# Patient Record
Sex: MALE | Race: WHITE | NOT HISPANIC OR LATINO | Employment: OTHER | URBAN - METROPOLITAN AREA
[De-identification: names, ages, dates, MRNs, and addresses within clinical notes are randomized per-mention and may not be internally consistent; named-entity substitution may affect disease eponyms.]

---

## 2017-12-13 ENCOUNTER — HISTORICAL (OUTPATIENT)
Dept: PREADMISSION TESTING | Facility: HOSPITAL | Age: 52
End: 2017-12-13

## 2017-12-13 LAB
BUN SERPL-MCNC: 15 MG/DL (ref 7–18)
CALCIUM SERPL-MCNC: 8 MG/DL (ref 8.5–10.1)
CHLORIDE SERPL-SCNC: 104 MMOL/L (ref 98–107)
CO2 SERPL-SCNC: 27 MMOL/L (ref 21–32)
CREAT SERPL-MCNC: 1.25 MG/DL (ref 0.7–1.3)
GLUCOSE SERPL-MCNC: 79 MG/DL (ref 74–106)
POTASSIUM SERPL-SCNC: 4.4 MMOL/L (ref 3.5–5.1)
SODIUM SERPL-SCNC: 138 MMOL/L (ref 136–145)

## 2017-12-14 ENCOUNTER — HISTORICAL (OUTPATIENT)
Dept: SURGERY | Facility: HOSPITAL | Age: 52
End: 2017-12-14

## 2018-05-15 ENCOUNTER — HISTORICAL (OUTPATIENT)
Dept: INTERNAL MEDICINE | Facility: CLINIC | Age: 53
End: 2018-05-15

## 2018-07-26 ENCOUNTER — HISTORICAL (OUTPATIENT)
Dept: ADMINISTRATIVE | Facility: HOSPITAL | Age: 53
End: 2018-07-26

## 2018-07-26 ENCOUNTER — HISTORICAL (OUTPATIENT)
Dept: INTERNAL MEDICINE | Facility: CLINIC | Age: 53
End: 2018-07-26

## 2018-07-26 LAB
ABS NEUT (OLG): 4.4 X10(3)/MCL (ref 2.1–9.2)
ALBUMIN SERPL-MCNC: 2.8 GM/DL (ref 3.4–5)
ALBUMIN/GLOB SERPL: 1 RATIO (ref 1–2)
ALP SERPL-CCNC: 154 UNIT/L (ref 45–117)
ALT SERPL-CCNC: 16 UNIT/L (ref 12–78)
AST SERPL-CCNC: 42 UNIT/L (ref 15–37)
BASOPHILS # BLD AUTO: 0.07 X10(3)/MCL
BASOPHILS NFR BLD AUTO: 1 %
BILIRUB SERPL-MCNC: 0.5 MG/DL (ref 0.2–1)
BILIRUBIN DIRECT+TOT PNL SERPL-MCNC: 0.2 MG/DL
BILIRUBIN DIRECT+TOT PNL SERPL-MCNC: 0.3 MG/DL
BUN SERPL-MCNC: 9 MG/DL (ref 7–18)
CALCIUM SERPL-MCNC: 7.9 MG/DL (ref 8.5–10.1)
CHLORIDE SERPL-SCNC: 103 MMOL/L (ref 98–107)
CO2 SERPL-SCNC: 29 MMOL/L (ref 21–32)
CREAT SERPL-MCNC: 1.2 MG/DL (ref 0.6–1.3)
EOSINOPHIL # BLD AUTO: 0.36 X10(3)/MCL
EOSINOPHIL NFR BLD AUTO: 5 %
ERYTHROCYTE [DISTWIDTH] IN BLOOD BY AUTOMATED COUNT: 19 % (ref 11.5–14.5)
FERRITIN SERPL-MCNC: 19.2 NG/ML (ref 26–388)
FOLATE SERPL-MCNC: 59.5 NG/ML (ref 3.1–17.5)
GLOBULIN SER-MCNC: 5.5 GM/ML (ref 2.3–3.5)
GLUCOSE SERPL-MCNC: 110 MG/DL (ref 74–106)
HCT VFR BLD AUTO: 30.7 % (ref 40–51)
HGB BLD-MCNC: 8.1 GM/DL (ref 13.5–17.5)
IMM GRANULOCYTES # BLD AUTO: 0.01 10*3/UL
IMM GRANULOCYTES NFR BLD AUTO: 0 %
IRON SATN MFR SERPL: 4.9 % (ref 15–50)
IRON SERPL-MCNC: 17 MCG/DL (ref 65–175)
LYMPHOCYTES # BLD AUTO: 1.67 X10(3)/MCL
LYMPHOCYTES NFR BLD AUTO: 23 % (ref 13–40)
MCH RBC QN AUTO: 19.8 PG (ref 26–34)
MCHC RBC AUTO-ENTMCNC: 26.4 GM/DL (ref 31–37)
MCV RBC AUTO: 74.9 FL (ref 80–100)
MONOCYTES # BLD AUTO: 0.69 X10(3)/MCL
MONOCYTES NFR BLD AUTO: 10 % (ref 4–12)
NEUTROPHILS # BLD AUTO: 4.4 X10(3)/MCL
NEUTROPHILS NFR BLD AUTO: 61 X10(3)/MCL
PLATELET # BLD AUTO: 348 X10(3)/MCL (ref 130–400)
PMV BLD AUTO: 9.7 FL (ref 7.4–10.4)
POTASSIUM SERPL-SCNC: 4.4 MMOL/L (ref 3.5–5.1)
PROT SERPL-MCNC: 8.3 GM/DL (ref 6.4–8.2)
RBC # BLD AUTO: 4.1 X10(6)/MCL (ref 4.5–5.9)
RET# (OHS): 0.08 X10(6)/MCL (ref 0.02–0.09)
RETICULOCYTE COUNT AUTOMATED (OLG): 2 % (ref 0.5–1.5)
SODIUM SERPL-SCNC: 139 MMOL/L (ref 136–145)
TIBC SERPL-MCNC: 347 MCG/DL (ref 250–450)
TRANSFERRIN SERPL-MCNC: 265 MG/DL (ref 200–360)
VIT B12 SERPL-MCNC: 985 PG/ML (ref 193–986)
WBC # SPEC AUTO: 7.2 X10(3)/MCL (ref 4.5–11)

## 2019-03-18 ENCOUNTER — HISTORICAL (OUTPATIENT)
Dept: LAB | Facility: HOSPITAL | Age: 54
End: 2019-03-18

## 2019-03-18 LAB
ABS NEUT (OLG): 7.79 X10(3)/MCL (ref 2.1–9.2)
ALBUMIN SERPL-MCNC: 2.1 GM/DL (ref 3.4–5)
ALBUMIN/GLOB SERPL: 0.7 RATIO (ref 1.1–2)
ALP SERPL-CCNC: 177 UNIT/L (ref 50–136)
ALT SERPL-CCNC: 20 UNIT/L (ref 12–78)
AST SERPL-CCNC: 142 UNIT/L (ref 15–37)
BASOPHILS # BLD AUTO: 0.1 X10(3)/MCL (ref 0–0.2)
BASOPHILS NFR BLD AUTO: 1 %
BILIRUB SERPL-MCNC: 22.9 MG/DL (ref 0.2–1)
BILIRUBIN DIRECT+TOT PNL SERPL-MCNC: 19.1 MG/DL (ref 0–0.5)
BILIRUBIN DIRECT+TOT PNL SERPL-MCNC: 3.8 MG/DL (ref 0–0.8)
BUN SERPL-MCNC: 19 MG/DL (ref 7–18)
CALCIUM SERPL-MCNC: 8 MG/DL (ref 8.5–10.1)
CHLORIDE SERPL-SCNC: 107 MMOL/L (ref 98–107)
CO2 SERPL-SCNC: 24 MMOL/L (ref 21–32)
CREAT SERPL-MCNC: 1.69 MG/DL (ref 0.7–1.3)
EOSINOPHIL # BLD AUTO: 0.3 X10(3)/MCL (ref 0–0.9)
EOSINOPHIL NFR BLD AUTO: 3 %
ERYTHROCYTE [DISTWIDTH] IN BLOOD BY AUTOMATED COUNT: 17.1 % (ref 11.5–17)
GLOBULIN SER-MCNC: 3.1 GM/DL (ref 2.4–3.5)
GLUCOSE SERPL-MCNC: 69 MG/DL (ref 74–106)
HCT VFR BLD AUTO: 33 % (ref 42–52)
HGB BLD-MCNC: 10.5 GM/DL (ref 14–18)
LYMPHOCYTES # BLD AUTO: 1.2 X10(3)/MCL (ref 0.6–4.6)
LYMPHOCYTES NFR BLD AUTO: 12 %
MCH RBC QN AUTO: 30.3 PG (ref 27–31)
MCHC RBC AUTO-ENTMCNC: 31.8 GM/DL (ref 33–36)
MCV RBC AUTO: 95.4 FL (ref 80–94)
MONOCYTES # BLD AUTO: 0.5 X10(3)/MCL (ref 0.1–1.3)
MONOCYTES NFR BLD AUTO: 5 %
NEUTROPHILS # BLD AUTO: 7.79 X10(3)/MCL (ref 2.1–9.2)
NEUTROPHILS NFR BLD AUTO: 78 %
PLATELET # BLD AUTO: 253 X10(3)/MCL (ref 130–400)
PMV BLD AUTO: 11.7 FL (ref 9.4–12.4)
POTASSIUM SERPL-SCNC: 3.9 MMOL/L (ref 3.5–5.1)
PROT SERPL-MCNC: 5.2 GM/DL (ref 6.4–8.2)
RBC # BLD AUTO: 3.46 X10(6)/MCL (ref 4.7–6.1)
SODIUM SERPL-SCNC: 142 MMOL/L (ref 136–145)
WBC # SPEC AUTO: 10 X10(3)/MCL (ref 4.5–11.5)

## 2019-03-25 ENCOUNTER — HISTORICAL (OUTPATIENT)
Dept: LAB | Facility: HOSPITAL | Age: 54
End: 2019-03-25

## 2019-03-25 LAB
ALBUMIN SERPL-MCNC: 2.3 GM/DL (ref 3.4–5)
ALP SERPL-CCNC: 175 UNIT/L (ref 50–136)
ALT SERPL-CCNC: 22 UNIT/L (ref 12–78)
APPEARANCE, UA: CLEAR
AST SERPL-CCNC: 150 UNIT/L (ref 15–37)
BACTERIA #/AREA URNS AUTO: ABNORMAL /HPF
BILIRUB SERPL-MCNC: 22.5 MG/DL (ref 0.2–1)
BILIRUB UR QL STRIP: ABNORMAL
BILIRUBIN DIRECT+TOT PNL SERPL-MCNC: 17.9 MG/DL (ref 0–0.2)
BILIRUBIN DIRECT+TOT PNL SERPL-MCNC: 4.6 MG/DL (ref 0–0.8)
COLOR UR: ABNORMAL
GLUCOSE (UA): NEGATIVE
HGB UR QL STRIP: NEGATIVE
KETONES UR QL STRIP: NEGATIVE
LEUKOCYTE ESTERASE UR QL STRIP: ABNORMAL
LIVER PROFILE INTERP: ABNORMAL
NITRITE UR QL STRIP.AUTO: POSITIVE
PH UR STRIP: 6 [PH] (ref 5–9)
PROT SERPL-MCNC: 5.6 GM/DL (ref 6.4–8.2)
PROT UR QL STRIP: NEGATIVE
RBC #/AREA URNS HPF: ABNORMAL /[HPF]
SP GR UR STRIP: 1.02 (ref 1–1.03)
SQUAMOUS EPITHELIAL, UA: ABNORMAL
UROBILINOGEN UR STRIP-ACNC: 2
WBC #/AREA URNS AUTO: ABNORMAL /HPF (ref 0–3)

## 2019-04-05 ENCOUNTER — TELEPHONE (OUTPATIENT)
Dept: GASTROENTEROLOGY | Facility: CLINIC | Age: 54
End: 2019-04-05

## 2019-04-05 NOTE — TELEPHONE ENCOUNTER
Left message with Yolanda voicemail regarding scheduling appt----- Message from Lisbet Sotomayor sent at 4/5/2019 10:17 AM CDT -----  Contact: Manuel   Requesting a call back regarding referral.please call back at [phone].      Thanks,  Lisbet Sotomayor

## 2019-04-18 ENCOUNTER — TELEPHONE (OUTPATIENT)
Dept: GASTROENTEROLOGY | Facility: CLINIC | Age: 54
End: 2019-04-18

## 2019-05-02 ENCOUNTER — OFFICE VISIT (OUTPATIENT)
Dept: GASTROENTEROLOGY | Facility: CLINIC | Age: 54
End: 2019-05-02

## 2019-05-02 VITALS — DIASTOLIC BLOOD PRESSURE: 82 MMHG | HEART RATE: 68 BPM | SYSTOLIC BLOOD PRESSURE: 120 MMHG | WEIGHT: 234.38 LBS

## 2019-05-02 DIAGNOSIS — K70.10 ACUTE ALCOHOLIC HEPATITIS: Primary | ICD-10-CM

## 2019-05-02 PROCEDURE — 99213 OFFICE O/P EST LOW 20 MIN: CPT | Mod: PBBFAC | Performed by: INTERNAL MEDICINE

## 2019-05-02 PROCEDURE — 99204 OFFICE O/P NEW MOD 45 MIN: CPT | Mod: S$PBB,,, | Performed by: INTERNAL MEDICINE

## 2019-05-02 PROCEDURE — 99999 PR PBB SHADOW E&M-EST. PATIENT-LVL III: ICD-10-PCS | Mod: PBBFAC,,, | Performed by: INTERNAL MEDICINE

## 2019-05-02 PROCEDURE — 99204 PR OFFICE/OUTPT VISIT, NEW, LEVL IV, 45-59 MIN: ICD-10-PCS | Mod: S$PBB,,, | Performed by: INTERNAL MEDICINE

## 2019-05-02 PROCEDURE — 99999 PR PBB SHADOW E&M-EST. PATIENT-LVL III: CPT | Mod: PBBFAC,,, | Performed by: INTERNAL MEDICINE

## 2019-05-02 RX ORDER — FUROSEMIDE 20 MG/1
20 TABLET ORAL DAILY
COMMUNITY
End: 2019-07-05 | Stop reason: SDUPTHER

## 2019-05-02 RX ORDER — PANTOPRAZOLE SODIUM 40 MG/1
40 TABLET, DELAYED RELEASE ORAL DAILY
COMMUNITY

## 2019-05-02 RX ORDER — NEPHROCAP 1 MG
1 CAP ORAL DAILY
Status: ON HOLD | COMMUNITY
Start: 2019-03-12 | End: 2019-10-25 | Stop reason: HOSPADM

## 2019-05-02 NOTE — PROGRESS NOTES
Subjective:     Buck Gann is here for evaluation of Alcoholic hepatitis (elevated bilirubin 32 reported in Feb 2019, hospitalized 3 weeks)      HPI  Buck Gann is here for eval of alcohol liver disease. He has a strongly h/o EtOH stopped in Jan. He presented in Feb to OSH with GIB and likely EtOH hepatitis vs acute on chronic liver disease. He continues to be abstinent of EtOH. His Tbili was 32, he has labs from recently and Tbile was down to 8.5. MELD on recent labs 20. He is clinically feeling better. No ascites, encephalopathy or GIB.    Outside records reviewed    Ultrasound from March 2019 patent hepatic vasculature, para reversal of flow in the left portal vein.    Bleeding scan from February 2019 shows no gastrointestinal hemorrhage    CT scan of the abdomen and pelvis with and without contrast from July 2015 shows size progression of pre-existing shola pancreatic pseudocyst and development of numerous new pseudocyst.  Bilateral pleural effusions and small ascites.  Liver remains unremarkable.    Ultrasound from February 2019 shows hepatic steatosis.  No focal liver lesions however evaluation is limited.  Liver capsule is smooth.  Portal vein with patent and hepatopetal flow.    Upper endoscopy from February 2019 showed a normal esophagus. Stomach consistent with Aurora-en-Y gastric bypass.  At the anastomosis site there were some black stool.  Although the anastomosis site looked good with no ulcer.    Colonoscopy from February 2019 showed evidence of red tinged fluid throughout the entire colon.  Terminal ileum with evidence of red care fluid but no obvious bleeding source.  Cecum with evidence of some petechiae him it from barotrauma and coagulopathy but no evidence of AVMs.  Large internal hemorrhoids.    Repeat upper endoscopy from February 2019 showed a small ulcer with possible visible vessel near suture line    Review of Systems   Constitutional: Positive for fatigue.   HENT: Negative.    Eyes:  Negative.    Respiratory: Negative.    Cardiovascular: Negative.    Gastrointestinal: Negative.    Genitourinary: Negative.    Musculoskeletal: Negative.    Skin: Positive for color change.   Neurological: Negative.    Psychiatric/Behavioral: Negative.        Objective:     Physical Exam   Constitutional: He is oriented to person, place, and time. He appears well-developed and well-nourished. No distress.   HENT:   Head: Normocephalic and atraumatic.   Mouth/Throat: Oropharynx is clear and moist. No oropharyngeal exudate.   Eyes: Pupils are equal, round, and reactive to light. Right eye exhibits no discharge. Left eye exhibits no discharge. Scleral icterus is present.   Pulmonary/Chest: Effort normal and breath sounds normal. No respiratory distress. He has no wheezes.   Abdominal: Soft. He exhibits no distension. There is no tenderness.   Musculoskeletal: He exhibits no edema.   Neurological: He is alert and oriented to person, place, and time.   Skin:   jaundiced   Psychiatric: He has a normal mood and affect. His behavior is normal.   Vitals reviewed.      Computed MELD-Na score unavailable. Necessary lab results were not found in the last year.  Computed MELD score unavailable. Necessary lab results were not found in the last year.    No results found for: WBC, HGB, HCT, PLT  No results found for: BUN, CREATININE, GLU, CALCIUM, NA, K, CL, MG  No results found for: AST, ALT, ALKPHOS, BILITOT, ALBUMIN  No results found for: INR      Assessment/Plan:     1. Acute alcoholic hepatitis      Buck Gann is a 53 y.o. male withAlcoholic hepatitis (elevated bilirubin 32 reported in Feb 2019, hospitalized 3 weeks)    Acute alcoholic hepatitis- seems to be improving. Uncertain if there is underlying cirrhosis or not. He is clinically recompensating. Uncertain how much his MELD will improve, but it is improving so will continue to monitor before referring for transplant evaluation.  -Monitor MELD closely  -Advised complete  EtOH abstinence  -     Comprehensive metabolic panel; Future; Expected date: 05/02/2019  -     CBC auto differential; Future; Expected date: 05/02/2019  -     AFP tumor marker; Future; Expected date: 05/02/2019  -     Protime-INR; Future; Expected date: 05/02/2019  -     Hepatitis B surface antibody; Future; Expected date: 05/02/2019  -     Hepatitis C antibody; Future; Expected date: 05/02/2019  -     Hepatitis A antibody, IgG; Future; Expected date: 05/02/2019  -     US Abdomen Limited; Future; Expected date: 05/02/2019    RTC in 8 weeks with preclinic labs and imaging      Maryan Smith MD

## 2019-05-03 PROBLEM — K70.10 ACUTE ALCOHOLIC HEPATITIS: Status: ACTIVE | Noted: 2019-05-03

## 2019-06-26 ENCOUNTER — TELEPHONE (OUTPATIENT)
Dept: GASTROENTEROLOGY | Facility: CLINIC | Age: 54
End: 2019-06-26

## 2019-06-26 DIAGNOSIS — K70.10 ACUTE ALCOHOLIC HEPATITIS: Primary | ICD-10-CM

## 2019-06-26 NOTE — TELEPHONE ENCOUNTER
Returned patient call, he reports that his PCP increased lasix 20 mg to bid X 3d and today is day #3, reports not much help,  weight gain over 5 pounds and cannot bend legs, no labs checked since increasing diuretic, states PCP is in [place],  for 2 months.Asked patient if he is watching his salt intake and reading labels and paying attention to  salt content. He reports not adding much salt, mostly adds pepper.  Also complaining of hernia that has dropped causing a lot of pain. Asked patient who he is seeing in regards to hernia, he stated no one yet because of concern of elevated liver tests and recent hospitalization with kidney and liver problems. He is trying to make it to follow up appt next week without going to ER. He mentioned that he is out of pain medication,  I advised  that he needed to contact physician  office prescribing.    ----- Message from Mary Monae sent at 6/26/2019  2:48 PM CDT -----  Contact: pt   Pt is requesting a call back in regards to some issues that he is currently having.    [phone] (home)

## 2019-06-28 ENCOUNTER — TELEPHONE (OUTPATIENT)
Dept: GASTROENTEROLOGY | Facility: CLINIC | Age: 54
End: 2019-06-28

## 2019-06-28 NOTE — TELEPHONE ENCOUNTER
Called patient and advised that he needs labs drawn to see if diuretics can be increased, patient reports legs are a little better, he has been trying to watch sodium intake as advised and that he has an appt with Dr Smith next week, he would like to wait until then for labwork. Advised that if edema worsens/ increases  over weekend to contact office Monday am.

## 2019-07-03 ENCOUNTER — TELEPHONE (OUTPATIENT)
Dept: RADIOLOGY | Facility: HOSPITAL | Age: 54
End: 2019-07-03

## 2019-07-05 ENCOUNTER — HOSPITAL ENCOUNTER (OUTPATIENT)
Dept: RADIOLOGY | Facility: HOSPITAL | Age: 54
Discharge: HOME | End: 2019-07-05
Attending: INTERNAL MEDICINE
Payer: COMMERCIAL

## 2019-07-05 ENCOUNTER — TELEPHONE (OUTPATIENT)
Dept: TRANSPLANT | Facility: CLINIC | Age: 54
End: 2019-07-05

## 2019-07-05 ENCOUNTER — OFFICE VISIT (OUTPATIENT)
Dept: GASTROENTEROLOGY | Facility: CLINIC | Age: 54
End: 2019-07-05
Payer: COMMERCIAL

## 2019-07-05 VITALS — HEART RATE: 80 BPM | DIASTOLIC BLOOD PRESSURE: 86 MMHG | WEIGHT: 253.5 LBS | SYSTOLIC BLOOD PRESSURE: 120 MMHG

## 2019-07-05 DIAGNOSIS — R10.33 PERIUMBILICAL PAIN: ICD-10-CM

## 2019-07-05 DIAGNOSIS — N50.82 SCROTAL PAIN: ICD-10-CM

## 2019-07-05 DIAGNOSIS — R60.0 BILATERAL LEG EDEMA: ICD-10-CM

## 2019-07-05 DIAGNOSIS — E87.6 HYPOKALEMIA: ICD-10-CM

## 2019-07-05 DIAGNOSIS — K70.31 ALCOHOLIC CIRRHOSIS OF LIVER WITH ASCITES: Primary | ICD-10-CM

## 2019-07-05 DIAGNOSIS — K70.10 ACUTE ALCOHOLIC HEPATITIS: ICD-10-CM

## 2019-07-05 PROCEDURE — 99999 PR PBB SHADOW E&M-EST. PATIENT-LVL II: ICD-10-PCS | Mod: PBBFAC,,, | Performed by: INTERNAL MEDICINE

## 2019-07-05 PROCEDURE — 99212 OFFICE O/P EST SF 10 MIN: CPT | Mod: PBBFAC,25 | Performed by: INTERNAL MEDICINE

## 2019-07-05 PROCEDURE — 76705 ECHO EXAM OF ABDOMEN: CPT | Mod: TC

## 2019-07-05 PROCEDURE — 76705 ECHO EXAM OF ABDOMEN: CPT | Mod: 26,,, | Performed by: RADIOLOGY

## 2019-07-05 PROCEDURE — 76705 US ABDOMEN LIMITED: ICD-10-PCS | Mod: 26,,, | Performed by: RADIOLOGY

## 2019-07-05 PROCEDURE — 99215 OFFICE O/P EST HI 40 MIN: CPT | Mod: S$GLB,,, | Performed by: INTERNAL MEDICINE

## 2019-07-05 PROCEDURE — 99215 PR OFFICE/OUTPT VISIT, EST, LEVL V, 40-54 MIN: ICD-10-PCS | Mod: S$GLB,,, | Performed by: INTERNAL MEDICINE

## 2019-07-05 PROCEDURE — 99999 PR PBB SHADOW E&M-EST. PATIENT-LVL II: CPT | Mod: PBBFAC,,, | Performed by: INTERNAL MEDICINE

## 2019-07-05 RX ORDER — SPIRONOLACTONE 100 MG/1
100 TABLET, FILM COATED ORAL DAILY
Qty: 30 TABLET | Refills: 5 | Status: ON HOLD | OUTPATIENT
Start: 2019-07-05 | End: 2019-10-24 | Stop reason: CLARIF

## 2019-07-05 RX ORDER — TRAMADOL HYDROCHLORIDE 50 MG/1
50 TABLET ORAL EVERY 8 HOURS PRN
Qty: 30 TABLET | Refills: 0 | Status: SHIPPED | OUTPATIENT
Start: 2019-07-05 | End: 2019-08-22 | Stop reason: SDUPTHER

## 2019-07-05 RX ORDER — POTASSIUM CHLORIDE 20 MEQ/1
20 TABLET, EXTENDED RELEASE ORAL DAILY
Qty: 30 TABLET | Refills: 1 | Status: SHIPPED | OUTPATIENT
Start: 2019-07-05 | End: 2019-09-05 | Stop reason: SDUPTHER

## 2019-07-05 RX ORDER — FUROSEMIDE 40 MG/1
40 TABLET ORAL DAILY
Qty: 30 TABLET | Refills: 5 | Status: ON HOLD | OUTPATIENT
Start: 2019-07-05 | End: 2019-10-25 | Stop reason: HOSPADM

## 2019-07-05 RX ORDER — TRAMADOL HYDROCHLORIDE 50 MG/1
50 TABLET ORAL EVERY 6 HOURS
Refills: 0 | COMMUNITY
Start: 2019-05-17 | End: 2019-07-05 | Stop reason: SDUPTHER

## 2019-07-05 NOTE — PROGRESS NOTES
Subjective:     Buck Gann is here for follow up of cirrhosis.    HPI  Since Buck Gann's last visit the main issues have been:  Significant increase in volume status.  He has gained about 20 lb since last visit.    Ascites-increased  Encephalopathy-none  GI bleeding-none    Reports significant lower extremity edema as well as scrotal swelling.  This has affected his ability to sleep as well as urination.  Overall his symptoms have worsened in terms of his volume status.  He reports being compliant with a low-salt diet but he has been drinking V8.    Patient reports continued alcohol abstinence.  He reports he has been abstinent since January or February.    Also with moderate periumbilical and scrotal pain, that has been persistent with volume overload, limiting relieving factors.    Review of Systems   Constitutional: Positive for activity change and fatigue.   HENT: Negative.    Eyes: Negative.    Respiratory: Negative.    Cardiovascular: Positive for leg swelling.   Gastrointestinal: Positive for abdominal distention.   Genitourinary: Positive for dysuria (2/2 swelling).   Musculoskeletal: Negative.    Skin: Negative.    Neurological: Negative.    Psychiatric/Behavioral: Positive for sleep disturbance.       Objective:     Physical Exam   Constitutional: He is oriented to person, place, and time. He appears well-developed and well-nourished. No distress.   HENT:   Head: Normocephalic and atraumatic.   Mouth/Throat: Oropharynx is clear and moist. No oropharyngeal exudate.   Eyes: Pupils are equal, round, and reactive to light. Conjunctivae are normal. Right eye exhibits no discharge. Left eye exhibits no discharge. No scleral icterus.   Pulmonary/Chest: Effort normal and breath sounds normal. No respiratory distress. He has no wheezes.   Abdominal: Soft. He exhibits distension (Non tense). There is tenderness (periumbilical, suspect hernia that is reducible). There is no rebound and no guarding.    Musculoskeletal: He exhibits edema ( 3+ bilateral lower extremity edema).   Neurological: He is alert and oriented to person, place, and time.   Psychiatric: He has a normal mood and affect. His behavior is normal.   Vitals reviewed.      US 7/2019  Cirrhotic liver without focal lesion demonstrated.  Ascites.    MELD-Na score: 20 at 7/5/2019  9:13 AM  MELD score: 20 at 7/5/2019  9:13 AM  Calculated from:  Serum Creatinine: 1.4 mg/dL at 7/5/2019  9:13 AM  Serum Sodium: 139 mmol/L (Rounded to 137 mmol/L) at 7/5/2019  9:13 AM  Total Bilirubin: 5.5 mg/dL at 7/5/2019  9:13 AM  INR(ratio): 1.4 at 7/5/2019  9:13 AM  Age: 54 years    WBC   Date Value Ref Range Status   07/05/2019 4.61 3.90 - 12.70 K/uL Final     Hemoglobin   Date Value Ref Range Status   07/05/2019 10.4 (L) 14.0 - 18.0 g/dL Final     Hematocrit   Date Value Ref Range Status   07/05/2019 31.8 (L) 40.0 - 54.0 % Final     Platelets   Date Value Ref Range Status   07/05/2019 207 150 - 350 K/uL Final     BUN, Bld   Date Value Ref Range Status   07/05/2019 10 6 - 20 mg/dL Final     Creatinine   Date Value Ref Range Status   07/05/2019 1.4 0.5 - 1.4 mg/dL Final     Glucose   Date Value Ref Range Status   07/05/2019 96 70 - 110 mg/dL Final     Calcium   Date Value Ref Range Status   07/05/2019 8.2 (L) 8.7 - 10.5 mg/dL Final     Sodium   Date Value Ref Range Status   07/05/2019 139 136 - 145 mmol/L Final     Potassium   Date Value Ref Range Status   07/05/2019 3.0 (L) 3.5 - 5.1 mmol/L Final     Chloride   Date Value Ref Range Status   07/05/2019 108 95 - 110 mmol/L Final     AST   Date Value Ref Range Status   07/05/2019 36 10 - 40 U/L Final     ALT   Date Value Ref Range Status   07/05/2019 11 10 - 44 U/L Final     Alkaline Phosphatase   Date Value Ref Range Status   07/05/2019 108 55 - 135 U/L Final     Total Bilirubin   Date Value Ref Range Status   07/05/2019 5.5 (H) 0.1 - 1.0 mg/dL Final     Comment:     For infants and newborns, interpretation of results  should be based  on gestational age, weight and in agreement with clinical  observations.  Premature Infant recommended reference ranges:  Up to 24 hours.............<8.0 mg/dL  Up to 48 hours............<12.0 mg/dL  3-5 days..................<15.0 mg/dL  6-29 days.................<15.0 mg/dL       Albumin   Date Value Ref Range Status   07/05/2019 2.5 (L) 3.5 - 5.2 g/dL Final     INR   Date Value Ref Range Status   07/05/2019 1.4 (H) 0.8 - 1.2 Final     Comment:     Coumadin Therapy:  2.0 - 3.0 for INR for all indicators except mechanical heart valves  and antiphospholipid syndromes which should use 2.5 - 3.5.           Assessment/Plan:     1. Alcoholic cirrhosis of liver with ascites    2. Hypokalemia    3. Bilateral leg edema    4. Periumbilical pain    5. Scrotal pain      Buck Gann is a 54 y.o. male withCirrhosis    Alcoholic cirrhosis of liver with ascites-meld score remains elevated and now patient has more evidence of significant liver decompensation.  Also suggestion of cirrhosis on imaging.  Given worsening clinical picture and elevated meld score I recommend proceeding with liver transplant evaluation.  -will refer for full transplant evaluation  -     Comprehensive metabolic panel; Future; Expected date: 07/05/2019  -     CBC auto differential; Future; Expected date: 07/05/2019  -     Protime-INR; Future; Expected date: 07/05/2019  -     Basic metabolic panel; Future; Expected date: 07/05/2019  -     spironolactone (ALDACTONE) 100 MG tablet; Take 1 tablet (100 mg total) by mouth once daily.  Dispense: 30 tablet; Refill: 5  -     furosemide (LASIX) 40 MG tablet; Take 1 tablet (40 mg total) by mouth once daily.  Dispense: 30 tablet; Refill: 5  -     Comprehensive metabolic panel; Future; Expected date: 07/05/2019  -     CBC auto differential; Future; Expected date: 07/05/2019  -     Protime-INR; Future; Expected date: 07/05/2019    Hypokalemia-likely from Lasix  -will add Aldactone for volume management  as well as hypokalemia  -also start potassium replacement  -check BMP on Tuesday  -     Basic metabolic panel; Future; Expected date: 07/05/2019  -     spironolactone (ALDACTONE) 100 MG tablet; Take 1 tablet (100 mg total) by mouth once daily.  Dispense: 30 tablet; Refill: 5  -     potassium chloride SA (K-DUR,KLOR-CON) 20 MEQ tablet; Take 1 tablet (20 mEq total) by mouth once daily.  Dispense: 30 tablet; Refill: 1    Bilateral leg edema-uncontrolled  -increased diuretic  -     Basic metabolic panel; Future; Expected date: 07/05/2019  -     spironolactone (ALDACTONE) 100 MG tablet; Take 1 tablet (100 mg total) by mouth once daily.  Dispense: 30 tablet; Refill: 5  -     furosemide (LASIX) 40 MG tablet; Take 1 tablet (40 mg total) by mouth once daily.  Dispense: 30 tablet; Refill: 5    Return to clinic in 2 weeks with nurse practitioner to reassess volume status and electrolytes; patient can continue to follow with nurse practitioner as needed until he sees me in 6 weeks from now    UNOS Patient Status  Functional Status: 60% - Requires occasional assistance but is able to care for needs  Physical Capacity: Limited Mobility    Patient was seen in the liver transplant department at [facility].    Maryan Smith MD

## 2019-07-05 NOTE — TELEPHONE ENCOUNTER
Referral received from Dr Maryan Smith  Initial  referral from Dr. Jain   Patient with Alcoholic cirrhosis of liver with ascites. MELD 20  ICD-10:  K70.31  Referred for liver transplant for EVALUATION.    Referral completed and forwarded to [facility].          Insurance: Epic

## 2019-07-07 RX ORDER — POTASSIUM CHLORIDE 20 MEQ/1
TABLET, EXTENDED RELEASE ORAL
Qty: 90 TABLET | Refills: 1 | OUTPATIENT
Start: 2019-07-07

## 2019-07-08 ENCOUNTER — TELEPHONE (OUTPATIENT)
Dept: TRANSPLANT | Facility: CLINIC | Age: 54
End: 2019-07-08

## 2019-07-08 NOTE — TELEPHONE ENCOUNTER
Pt called lvm re need for insurance information. Per [employer] [employer]. Pt did have insurance pt it termed out. Pt currently with no insurance.

## 2019-07-10 ENCOUNTER — TELEPHONE (OUTPATIENT)
Dept: TRANSPLANT | Facility: CLINIC | Age: 54
End: 2019-07-10

## 2019-07-10 NOTE — LETTER
July 10, 2019    Buck [place]ART[place] [id number]             [facility] Liver Transplant  [address]  [place] [address]  Phone: [phone] Dear  Azeem:    We are writing to you because we have made multiple attempts to reach you by phone and have been unsuccessful.        Please call us at your earliest convenience.  Multi-Organ Transplant [phone]    Sincerely,      [facility]   [address] [place] [address] [phone]

## 2019-07-10 NOTE — TELEPHONE ENCOUNTER
Spoke to pt he stated he has medical assistance at [facility] but he was informed this does not cover insurance.     He stated he does have insurance active  7/1/19 and he can send me a copy of the card.      Pt given email to send a pic of the cards.     Pending insurance info.

## 2019-07-10 NOTE — TELEPHONE ENCOUNTER
Pt called I left a second message re need for insurance information. Per [employer]. Pt did have insurance pt it termed out. Pt currently with no insurance.  Message to Dr Smith.

## 2019-07-19 ENCOUNTER — LAB VISIT (OUTPATIENT)
Dept: LAB | Facility: HOSPITAL | Age: 54
End: 2019-07-19
Attending: INTERNAL MEDICINE
Payer: COMMERCIAL

## 2019-07-19 ENCOUNTER — OFFICE VISIT (OUTPATIENT)
Dept: GASTROENTEROLOGY | Facility: CLINIC | Age: 54
End: 2019-07-19
Payer: COMMERCIAL

## 2019-07-19 ENCOUNTER — TELEPHONE (OUTPATIENT)
Dept: GASTROENTEROLOGY | Facility: CLINIC | Age: 54
End: 2019-07-19

## 2019-07-19 VITALS
WEIGHT: 224.44 LBS | SYSTOLIC BLOOD PRESSURE: 126 MMHG | BODY MASS INDEX: 33.24 KG/M2 | HEART RATE: 76 BPM | DIASTOLIC BLOOD PRESSURE: 76 MMHG | HEIGHT: 69 IN

## 2019-07-19 DIAGNOSIS — N50.82 SCROTAL PAIN: ICD-10-CM

## 2019-07-19 DIAGNOSIS — K70.31 ALCOHOLIC CIRRHOSIS OF LIVER WITH ASCITES: Primary | ICD-10-CM

## 2019-07-19 DIAGNOSIS — K70.31 ALCOHOLIC CIRRHOSIS OF LIVER WITH ASCITES: ICD-10-CM

## 2019-07-19 DIAGNOSIS — R60.0 BILATERAL LEG EDEMA: ICD-10-CM

## 2019-07-19 LAB
BASOPHILS # BLD AUTO: 0.03 K/UL (ref 0–0.2)
BASOPHILS # BLD AUTO: 0.03 K/UL (ref 0–0.2)
BASOPHILS NFR BLD: 0.5 % (ref 0–1.9)
BASOPHILS NFR BLD: 0.5 % (ref 0–1.9)
DIFFERENTIAL METHOD: ABNORMAL
DIFFERENTIAL METHOD: ABNORMAL
EOSINOPHIL # BLD AUTO: 0.1 K/UL (ref 0–0.5)
EOSINOPHIL # BLD AUTO: 0.1 K/UL (ref 0–0.5)
EOSINOPHIL NFR BLD: 2.1 % (ref 0–8)
EOSINOPHIL NFR BLD: 2.1 % (ref 0–8)
ERYTHROCYTE [DISTWIDTH] IN BLOOD BY AUTOMATED COUNT: 18.3 % (ref 11.5–14.5)
ERYTHROCYTE [DISTWIDTH] IN BLOOD BY AUTOMATED COUNT: 18.3 % (ref 11.5–14.5)
HCT VFR BLD AUTO: 32 % (ref 40–54)
HCT VFR BLD AUTO: 32 % (ref 40–54)
HGB BLD-MCNC: 10.5 G/DL (ref 14–18)
HGB BLD-MCNC: 10.5 G/DL (ref 14–18)
IMM GRANULOCYTES # BLD AUTO: 0.01 K/UL (ref 0–0.04)
IMM GRANULOCYTES # BLD AUTO: 0.01 K/UL (ref 0–0.04)
IMM GRANULOCYTES NFR BLD AUTO: 0.2 % (ref 0–0.5)
IMM GRANULOCYTES NFR BLD AUTO: 0.2 % (ref 0–0.5)
INR PPP: 1.5 (ref 0.8–1.2)
INR PPP: 1.5 (ref 0.8–1.2)
LYMPHOCYTES # BLD AUTO: 1.6 K/UL (ref 1–4.8)
LYMPHOCYTES # BLD AUTO: 1.6 K/UL (ref 1–4.8)
LYMPHOCYTES NFR BLD: 25.8 % (ref 18–48)
LYMPHOCYTES NFR BLD: 25.8 % (ref 18–48)
MCH RBC QN AUTO: 28.6 PG (ref 27–31)
MCH RBC QN AUTO: 28.6 PG (ref 27–31)
MCHC RBC AUTO-ENTMCNC: 32.8 G/DL (ref 32–36)
MCHC RBC AUTO-ENTMCNC: 32.8 G/DL (ref 32–36)
MCV RBC AUTO: 87 FL (ref 82–98)
MCV RBC AUTO: 87 FL (ref 82–98)
MONOCYTES # BLD AUTO: 0.6 K/UL (ref 0.3–1)
MONOCYTES # BLD AUTO: 0.6 K/UL (ref 0.3–1)
MONOCYTES NFR BLD: 9.6 % (ref 4–15)
MONOCYTES NFR BLD: 9.6 % (ref 4–15)
NEUTROPHILS # BLD AUTO: 3.8 K/UL (ref 1.8–7.7)
NEUTROPHILS # BLD AUTO: 3.8 K/UL (ref 1.8–7.7)
NEUTROPHILS NFR BLD: 62 % (ref 38–73)
NEUTROPHILS NFR BLD: 62 % (ref 38–73)
NRBC BLD-RTO: 0 /100 WBC
NRBC BLD-RTO: 0 /100 WBC
PLATELET # BLD AUTO: 155 K/UL (ref 150–350)
PLATELET # BLD AUTO: 155 K/UL (ref 150–350)
PMV BLD AUTO: 9.5 FL (ref 9.2–12.9)
PMV BLD AUTO: 9.5 FL (ref 9.2–12.9)
PROTHROMBIN TIME: 16.1 SEC (ref 9–12.5)
PROTHROMBIN TIME: 16.1 SEC (ref 9–12.5)
RBC # BLD AUTO: 3.67 M/UL (ref 4.6–6.2)
RBC # BLD AUTO: 3.67 M/UL (ref 4.6–6.2)
WBC # BLD AUTO: 6.17 K/UL (ref 3.9–12.7)
WBC # BLD AUTO: 6.17 K/UL (ref 3.9–12.7)

## 2019-07-19 PROCEDURE — 99214 OFFICE O/P EST MOD 30 MIN: CPT | Mod: S$GLB,,, | Performed by: NURSE PRACTITIONER

## 2019-07-19 PROCEDURE — 85610 PROTHROMBIN TIME: CPT

## 2019-07-19 PROCEDURE — 36415 COLL VENOUS BLD VENIPUNCTURE: CPT

## 2019-07-19 PROCEDURE — 99999 PR PBB SHADOW E&M-EST. PATIENT-LVL III: CPT | Mod: PBBFAC,,, | Performed by: NURSE PRACTITIONER

## 2019-07-19 PROCEDURE — 99214 PR OFFICE/OUTPT VISIT, EST, LEVL IV, 30-39 MIN: ICD-10-PCS | Mod: S$GLB,,, | Performed by: NURSE PRACTITIONER

## 2019-07-19 PROCEDURE — 99213 OFFICE O/P EST LOW 20 MIN: CPT | Mod: PBBFAC | Performed by: NURSE PRACTITIONER

## 2019-07-19 PROCEDURE — 99999 PR PBB SHADOW E&M-EST. PATIENT-LVL III: ICD-10-PCS | Mod: PBBFAC,,, | Performed by: NURSE PRACTITIONER

## 2019-07-19 PROCEDURE — 85025 COMPLETE CBC W/AUTO DIFF WBC: CPT

## 2019-07-19 NOTE — PROGRESS NOTES
Clinic Follow Up:  [facility] Follow Up Note    Reason for Follow Up:  The primary encounter diagnosis was Alcoholic cirrhosis of liver with ascites. Diagnoses of Bilateral leg edema and Scrotal pain were also pertinent to this visit.    PCP: Connie Sahni       HPI:  This is a 54 y.o. male here for follow up of the above  Pt previously seen by Dr. Smith.  Today is my first visit with him.   He states that since his last visit, he has been feeling better.  He has been taking the diuretics as prescribed.   Has had significant weight loss and reports that he is at his typical weight.   The BLE and scrotal pain has improved with improved fluid management.   He is waiting for the transplant evaluation to be scheduled.      Review of Systems   Constitutional: Positive for malaise/fatigue. Negative for chills, fever and weight loss.   Respiratory: Negative for cough.    Cardiovascular: Negative for chest pain.   Gastrointestinal:        Per HPI   Musculoskeletal: Negative for myalgias.   Skin: Negative for itching and rash.   Neurological: Negative for headaches.   Psychiatric/Behavioral: The patient is not nervous/anxious.        Medical History:  Past Medical History:   Diagnosis Date    Alcohol abuse     Cirrhosis     Pancreatitis        Surgical History:   Past Surgical History:   Procedure Laterality Date    GASTRIC BYPASS      Aur[place]-en-y       Family History:   No family history on file.    Social History:   Social History     Tobacco Use    Smoking status: Never Smoker    Smokeless tobacco: Never Used   Substance Use Topics    Alcohol use: Not Currently     Comment: stopped in Jan 2019    Drug use: Never       Allergies: Reviewed    Home Medications:  Current Outpatient Medications on File Prior to Visit   Medication Sig Dispense Refill    furosemide (LASIX) 40 MG tablet Take 1 tablet (40 mg total) by mouth once daily. 30 tablet 5    pantoprazole (PROTONIX) 20 MG tablet Take 20 mg  "by mouth once daily.      potassium chloride SA (K-DUR,KLOR-CON) 20 MEQ tablet Take 1 tablet (20 mEq total) by mouth once daily. 30 tablet 1    spironolactone (ALDACTONE) 100 MG tablet Take 1 tablet (100 mg total) by mouth once daily. 30 tablet 5    traMADol (ULTRAM) 50 mg tablet Take 1 tablet (50 mg total) by mouth every 8 (eight) hours as needed for Pain. 30 tablet 0    VIRT-CAPS 1 mg Cap Take 1 mg by mouth once daily.       No current facility-administered medications on file prior to visit.        Physical Exam:  Vital Signs:  /76   Pulse 76   Ht 5' 9" (1.753 m)   Wt 101.8 kg (224 lb 6.9 oz)   BMI 33.14 kg/m²   Body mass index is 33.14 kg/m².  Physical Exam   Constitutional: He is oriented to person, place, and time. He appears well-developed.   HENT:   Head: Normocephalic.   Eyes: Scleral icterus is present.   Neck: Normal range of motion.   Cardiovascular: Normal rate and regular rhythm.   Pulmonary/Chest: Effort normal and breath sounds normal.   Abdominal: Soft. Bowel sounds are normal. He exhibits no distension. There is no tenderness.   Musculoskeletal: Normal range of motion. He exhibits edema (1+ BLE).   Neurological: He is alert and oriented to person, place, and time.   Skin: Skin is warm and dry.   Psychiatric: He has a normal mood and affect.   Vitals reviewed.      Labs: Pertinent labs reviewed.  MELD-Na score: 25 at 7/19/2019 10:33 AM  MELD score: 24 at 7/19/2019 10:33 AM  Calculated from:  Serum Creatinine: 1.8 mg/dL at 7/19/2019 10:33 AM  Serum Sodium: 136 mmol/L at 7/19/2019 10:33 AM  Total Bilirubin: 7.5 mg/dL at 7/19/2019 10:33 AM  INR(ratio): 1.5 at 7/19/2019  9:10 AM  Age: 54 years    Assessment:  1. Alcoholic cirrhosis of liver with ascites    2. Bilateral leg edema    3. Scrotal pain        Recommendations:  - MELD is elevated in comparison to previous  - Creatinine elevated from baseline.   - discussed with Dr. Smith.  Will have him decrease his lasix to 20mg and Aldactone " to 50mg daily  - repeat BMP in 5 days  Continue other medications.   - continue with current plan for transplant evaluation.     Return to Clinic:    As previously planned.

## 2019-07-19 NOTE — Clinical Note
Labs showed worsening kidney function with the increase in diuretics.  Per Dr. Smith., he needs to decrease the Aldactone to 50mg once daily and the Lasix 20mg once daily.  He also needs repeat labs next Wednesday.

## 2019-07-19 NOTE — TELEPHONE ENCOUNTER
Left message regarding decreasing diuretics, asked patient to contact office so that I could schedule labs for wed. Awaiting return call

## 2019-07-23 ENCOUNTER — TELEPHONE (OUTPATIENT)
Dept: TRANSPLANT | Facility: CLINIC | Age: 54
End: 2019-07-23

## 2019-07-23 NOTE — TELEPHONE ENCOUNTER
PT REPORTS NEW INSURANCE EFFECTIVE  7/1/19      Referral received from Dr Maryna Smith  Initial  referral from Dr. Jain   Patient with Alcoholic cirrhosis of liver with ascites. MELD 20  ICD-10:  K70.31  Referred for liver transplant for EVALUATION.    Referral completed and forwarded to [facility].           Insurance:   PRIMARY: [facility]   ID:[id number]      COPY OF CARD SENT BY EMAIL.

## 2019-07-29 ENCOUNTER — TELEPHONE (OUTPATIENT)
Dept: GASTROENTEROLOGY | Facility: CLINIC | Age: 54
End: 2019-07-29

## 2019-07-29 NOTE — TELEPHONE ENCOUNTER
Called and spoke with patient to schedule labs, he states that he wants to get his labs done in [facility], I saw that the [facility] is a partner with [facility]. Will the orders be in their system as well? He is currently on financial assistance. He would have to pay out of pocket if he gets them externally.

## 2019-07-29 NOTE — TELEPHONE ENCOUNTER
----- Message from ROSEANN Oscar sent at 7/19/2019  2:38 PM CDT -----  Labs showed worsening kidney function with the increase in diuretics.  Per Dr. Smith., he needs to decrease the Aldactone to 50mg once daily and the Lasix 20mg once daily.  He also needs repeat labs next Wednesday.

## 2019-07-30 ENCOUNTER — TELEPHONE (OUTPATIENT)
Dept: GASTROENTEROLOGY | Facility: CLINIC | Age: 54
End: 2019-07-30

## 2019-07-30 NOTE — TELEPHONE ENCOUNTER
S/w pt to let him know that lab orders has been sent to [facility]. Pt verbalized understanding.

## 2019-07-31 ENCOUNTER — HISTORICAL (OUTPATIENT)
Dept: ADMINISTRATIVE | Facility: HOSPITAL | Age: 54
End: 2019-07-31

## 2019-07-31 LAB
BUN SERPL-MCNC: 16 MG/DL (ref 7–18)
CALCIUM SERPL-MCNC: 8.1 MG/DL (ref 8.5–10.1)
CHLORIDE SERPL-SCNC: 103 MMOL/L (ref 98–107)
CO2 SERPL-SCNC: 24 MMOL/L (ref 21–32)
CREAT SERPL-MCNC: 1.43 MG/DL (ref 0.7–1.3)
CREAT/UREA NIT SERPL: 11.2
GLUCOSE SERPL-MCNC: 96 MG/DL (ref 74–106)
POTASSIUM SERPL-SCNC: 3.7 MMOL/L (ref 3.5–5.1)
SODIUM SERPL-SCNC: 136 MMOL/L (ref 136–145)

## 2019-08-07 ENCOUNTER — TELEPHONE (OUTPATIENT)
Dept: TRANSPLANT | Facility: CLINIC | Age: 54
End: 2019-08-07

## 2019-08-07 NOTE — TELEPHONE ENCOUNTER
Called pt to inform him that financial clearance has been obtained from the insurance company to initiate liver transplant evaluation.  Informed patient that evaluation will take approx 2 to 5 days to complete depending on rather standard or Fast pass evaluation scheduled.  Informed him that all testing will be done outpatient.  Patient voiced understanding of the need to have his caregiver present for the  and surgeon consult, as well as for the patient education.  Patient reports that he is on a fixed income and voiced concerns regarding finances.  He states that it will be difficult for him to pay for lodging.  Discussed scheduling some of the eval locally.  He verbalized understanding that all consults and imaging will need to be done at [facility] and the process will be prolonged.  Pt denies any further concerns/ questions regarding liver transplant evaluation at this time.

## 2019-08-12 ENCOUNTER — TELEPHONE (OUTPATIENT)
Dept: GASTROENTEROLOGY | Facility: CLINIC | Age: 54
End: 2019-08-12

## 2019-08-12 NOTE — TELEPHONE ENCOUNTER
----- Message from Cl Cardoza sent at 8/12/2019  8:53 AM CDT -----  Contact: pt   Type:  Needs Medical Advice    Who Called: ADRIEN ARENAS   Symptoms (please be specific):  How long has patient had these symptoms:   Pharmacy name and phone #:   Would the patient rather a call back or a response via [facility]? Call   Best Call Back Number:  [phone] (home)    Additional Information: pt is requesting a call back from the nurse in regards to the pt needing to move his apt to 08/20/2019 or 08/22/2019 because he is off those days

## 2019-08-22 ENCOUNTER — OFFICE VISIT (OUTPATIENT)
Dept: GASTROENTEROLOGY | Facility: CLINIC | Age: 54
End: 2019-08-22
Payer: COMMERCIAL

## 2019-08-22 ENCOUNTER — TELEPHONE (OUTPATIENT)
Dept: GASTROENTEROLOGY | Facility: CLINIC | Age: 54
End: 2019-08-22

## 2019-08-22 ENCOUNTER — LAB VISIT (OUTPATIENT)
Dept: LAB | Facility: HOSPITAL | Age: 54
End: 2019-08-22
Attending: NURSE PRACTITIONER
Payer: COMMERCIAL

## 2019-08-22 VITALS
BODY MASS INDEX: 35.98 KG/M2 | HEART RATE: 93 BPM | DIASTOLIC BLOOD PRESSURE: 81 MMHG | SYSTOLIC BLOOD PRESSURE: 121 MMHG | WEIGHT: 242.94 LBS | HEIGHT: 69 IN

## 2019-08-22 DIAGNOSIS — R60.0 BILATERAL LEG EDEMA: ICD-10-CM

## 2019-08-22 DIAGNOSIS — K70.31 ALCOHOLIC CIRRHOSIS OF LIVER WITH ASCITES: Primary | ICD-10-CM

## 2019-08-22 DIAGNOSIS — N50.82 SCROTAL PAIN: ICD-10-CM

## 2019-08-22 DIAGNOSIS — R10.33 PERIUMBILICAL PAIN: ICD-10-CM

## 2019-08-22 DIAGNOSIS — K70.10 ACUTE ALCOHOLIC HEPATITIS: ICD-10-CM

## 2019-08-22 LAB
ALBUMIN SERPL BCP-MCNC: 2.5 G/DL (ref 3.5–5.2)
ALP SERPL-CCNC: 135 U/L (ref 55–135)
ALT SERPL W/O P-5'-P-CCNC: 7 U/L (ref 10–44)
ANION GAP SERPL CALC-SCNC: 10 MMOL/L (ref 8–16)
AST SERPL-CCNC: 43 U/L (ref 10–40)
BASOPHILS # BLD AUTO: 0.03 K/UL (ref 0–0.2)
BASOPHILS NFR BLD: 0.5 % (ref 0–1.9)
BILIRUB SERPL-MCNC: 6.1 MG/DL (ref 0.1–1)
BUN SERPL-MCNC: 14 MG/DL (ref 6–20)
CALCIUM SERPL-MCNC: 8.1 MG/DL (ref 8.7–10.5)
CHLORIDE SERPL-SCNC: 106 MMOL/L (ref 95–110)
CO2 SERPL-SCNC: 21 MMOL/L (ref 23–29)
CREAT SERPL-MCNC: 1.6 MG/DL (ref 0.5–1.4)
DIFFERENTIAL METHOD: ABNORMAL
EOSINOPHIL # BLD AUTO: 0 K/UL (ref 0–0.5)
EOSINOPHIL NFR BLD: 0.7 % (ref 0–8)
ERYTHROCYTE [DISTWIDTH] IN BLOOD BY AUTOMATED COUNT: 15.7 % (ref 11.5–14.5)
EST. GFR  (AFRICAN AMERICAN): 55.2 ML/MIN/1.73 M^2
EST. GFR  (NON AFRICAN AMERICAN): 47.8 ML/MIN/1.73 M^2
GLUCOSE SERPL-MCNC: 93 MG/DL (ref 70–110)
HCT VFR BLD AUTO: 31.1 % (ref 40–54)
HGB BLD-MCNC: 10.1 G/DL (ref 14–18)
IMM GRANULOCYTES # BLD AUTO: 0.01 K/UL (ref 0–0.04)
IMM GRANULOCYTES NFR BLD AUTO: 0.2 % (ref 0–0.5)
INR PPP: 1.3 (ref 0.8–1.2)
LYMPHOCYTES # BLD AUTO: 0.9 K/UL (ref 1–4.8)
LYMPHOCYTES NFR BLD: 14.6 % (ref 18–48)
MCH RBC QN AUTO: 32.2 PG (ref 27–31)
MCHC RBC AUTO-ENTMCNC: 32.5 G/DL (ref 32–36)
MCV RBC AUTO: 99 FL (ref 82–98)
MONOCYTES # BLD AUTO: 0.7 K/UL (ref 0.3–1)
MONOCYTES NFR BLD: 11.1 % (ref 4–15)
NEUTROPHILS # BLD AUTO: 4.4 K/UL (ref 1.8–7.7)
NEUTROPHILS NFR BLD: 72.9 % (ref 38–73)
NRBC BLD-RTO: 0 /100 WBC
PLATELET # BLD AUTO: 175 K/UL (ref 150–350)
PMV BLD AUTO: 10 FL (ref 9.2–12.9)
POTASSIUM SERPL-SCNC: 3.5 MMOL/L (ref 3.5–5.1)
PROT SERPL-MCNC: 6.1 G/DL (ref 6–8.4)
PROTHROMBIN TIME: 13.5 SEC (ref 9–12.5)
RBC # BLD AUTO: 3.14 M/UL (ref 4.6–6.2)
SODIUM SERPL-SCNC: 137 MMOL/L (ref 136–145)
WBC # BLD AUTO: 5.97 K/UL (ref 3.9–12.7)

## 2019-08-22 PROCEDURE — 99214 OFFICE O/P EST MOD 30 MIN: CPT | Mod: NTX,S$GLB,, | Performed by: NURSE PRACTITIONER

## 2019-08-22 PROCEDURE — 99999 PR PBB SHADOW E&M-EST. PATIENT-LVL III: ICD-10-PCS | Mod: PBBFAC,TXP,, | Performed by: NURSE PRACTITIONER

## 2019-08-22 PROCEDURE — 99214 PR OFFICE/OUTPT VISIT, EST, LEVL IV, 30-39 MIN: ICD-10-PCS | Mod: NTX,S$GLB,, | Performed by: NURSE PRACTITIONER

## 2019-08-22 PROCEDURE — 3008F PR BODY MASS INDEX (BMI) DOCUMENTED: ICD-10-PCS | Mod: CPTII,NTX,S$GLB, | Performed by: NURSE PRACTITIONER

## 2019-08-22 PROCEDURE — 36415 COLL VENOUS BLD VENIPUNCTURE: CPT | Mod: NTX

## 2019-08-22 PROCEDURE — 85025 COMPLETE CBC W/AUTO DIFF WBC: CPT | Mod: NTX

## 2019-08-22 PROCEDURE — 3008F BODY MASS INDEX DOCD: CPT | Mod: CPTII,NTX,S$GLB, | Performed by: NURSE PRACTITIONER

## 2019-08-22 PROCEDURE — 99999 PR PBB SHADOW E&M-EST. PATIENT-LVL III: CPT | Mod: PBBFAC,TXP,, | Performed by: NURSE PRACTITIONER

## 2019-08-22 PROCEDURE — 80053 COMPREHEN METABOLIC PANEL: CPT | Mod: TXP

## 2019-08-22 PROCEDURE — 85610 PROTHROMBIN TIME: CPT | Mod: NTX

## 2019-08-22 RX ORDER — TRAMADOL HYDROCHLORIDE 50 MG/1
50 TABLET ORAL EVERY 8 HOURS PRN
Qty: 30 TABLET | Refills: 0 | Status: ON HOLD | OUTPATIENT
Start: 2019-08-22 | End: 2019-10-23

## 2019-08-22 NOTE — PROGRESS NOTES
Clinic Follow Up:  [facility] Follow Up Note    Reason for Follow Up:  The primary encounter diagnosis was Alcoholic cirrhosis of liver with ascites. Diagnoses of Periumbilical pain, Scrotal pain, and Bilateral leg edema were also pertinent to this visit.    PCP: Connie Sahni       HPI:  This is a 54 y.o. male here for follow up of the above  Pt states that he has been feeling overall stable. Ascites is stable on current diuretics.   Labs today are pending.   He continues with scrotal and abdominal pain.   Denies any abdominal pain.  No nausea or vomiting.  No change in bowel pattern.  No melena or hematochezia. No weight loss.  No upper GI bleeding.  No overt confusion.      Review of Systems   Constitutional: Positive for malaise/fatigue. Negative for chills, fever and weight loss.   Respiratory: Negative for cough.    Cardiovascular: Negative for chest pain.   Gastrointestinal:        Per HPI   Musculoskeletal: Negative for myalgias.   Skin: Negative for itching and rash.   Neurological: Negative for headaches.   Psychiatric/Behavioral: The patient is not nervous/anxious.        Medical History:  Past Medical History:   Diagnosis Date    Alcohol abuse     Cirrhosis     Pancreatitis        Surgical History:   Past Surgical History:   Procedure Laterality Date    GASTRIC BYPASS      [facility]       Family History:   History reviewed. No pertinent family history.    Social History:   Social History     Tobacco Use    Smoking status: Never Smoker    Smokeless tobacco: Never Used   Substance Use Topics    Alcohol use: Not Currently     Comment: stopped in Jan 2019    Drug use: Never       Allergies: Reviewed    Home Medications:  Current Outpatient Medications on File Prior to Visit   Medication Sig Dispense Refill    furosemide (LASIX) 40 MG tablet Take 1 tablet (40 mg total) by mouth once daily. 30 tablet 5    pantoprazole (PROTONIX) 20 MG tablet Take 20 mg by mouth once daily.    "   potassium chloride SA (K-DUR,KLOR-CON) 20 MEQ tablet Take 1 tablet (20 mEq total) by mouth once daily. 30 tablet 1    spironolactone (ALDACTONE) 100 MG tablet Take 1 tablet (100 mg total) by mouth once daily. 30 tablet 5    VIRT-CAPS 1 mg Cap Take 1 mg by mouth once daily.      [DISCONTINUED] traMADol (ULTRAM) 50 mg tablet Take 1 tablet (50 mg total) by mouth every 8 (eight) hours as needed for Pain. 30 tablet 0     No current facility-administered medications on file prior to visit.        Physical Exam:  Vital Signs:  /81 (BP Location: Right arm, Patient Position: Sitting, BP Method: Small (Manual))   Pulse 93   Ht 5' 9" (1.753 m)   Wt 110.2 kg (242 lb 15.2 oz)   BMI 35.88 kg/m²   Body mass index is 35.88 kg/m².  Physical Exam   Constitutional: He is oriented to person, place, and time. He appears well-developed.   HENT:   Head: Normocephalic.   Neck: Normal range of motion.   Cardiovascular: Normal rate and regular rhythm.   Pulmonary/Chest: Effort normal and breath sounds normal.   Abdominal: Soft. Bowel sounds are normal. He exhibits no distension. There is tenderness (ascites).   Musculoskeletal: Normal range of motion. He exhibits edema.   Neurological: He is alert and oriented to person, place, and time.   Skin: Skin is warm and dry.   Psychiatric: He has a normal mood and affect.   Vitals reviewed.      Labs: Pertinent labs reviewed.  MELD-Na score: 25 at 7/19/2019 10:33 AM  MELD score: 24 at 7/19/2019 10:33 AM  Calculated from:  Serum Creatinine: 1.8 mg/dL at 7/19/2019 10:33 AM  Serum Sodium: 136 mmol/L at 7/19/2019 10:33 AM  Total Bilirubin: 7.5 mg/dL at 7/19/2019 10:33 AM  INR(ratio): 1.5 at 7/19/2019  9:10 AM  Age: 54 years      Assessment:  1. Alcoholic cirrhosis of liver with ascites    2. Periumbilical pain    3. Scrotal pain    4. Bilateral leg edema        Recommendations:  - stable   - Will refill tramadol once more.  Explained that he will need pain management or PCP for further " manage pain medications.   - will update pt once labs are resulted.   - continue current diuretics  - continue with Plan per transplant team for evaluation.     Return to Clinic:    Follow up in about 8 weeks (around 10/17/2019).

## 2019-08-22 NOTE — TELEPHONE ENCOUNTER
Patient called for results from the labs drawed today, test are still processing. Informed Patient when results were in we would call him.

## 2019-09-05 DIAGNOSIS — E87.6 HYPOKALEMIA: ICD-10-CM

## 2019-09-09 RX ORDER — POTASSIUM CHLORIDE 20 MEQ/1
TABLET, EXTENDED RELEASE ORAL
Qty: 30 TABLET | Refills: 0 | Status: ON HOLD | OUTPATIENT
Start: 2019-09-09 | End: 2019-10-24 | Stop reason: CLARIF

## 2019-09-10 DIAGNOSIS — K70.31 ALCOHOLIC CIRRHOSIS OF LIVER WITH ASCITES: Primary | ICD-10-CM

## 2019-09-10 DIAGNOSIS — Z76.82 ORGAN TRANSPLANT CANDIDATE: ICD-10-CM

## 2019-09-30 DIAGNOSIS — N50.82 SCROTAL PAIN: ICD-10-CM

## 2019-09-30 DIAGNOSIS — R10.33 PERIUMBILICAL PAIN: ICD-10-CM

## 2019-10-01 RX ORDER — TRAMADOL HYDROCHLORIDE 50 MG/1
50 TABLET ORAL EVERY 8 HOURS PRN
Qty: 30 TABLET | Refills: 0 | OUTPATIENT
Start: 2019-10-01

## 2019-10-04 ENCOUNTER — TELEPHONE (OUTPATIENT)
Dept: GASTROENTEROLOGY | Facility: CLINIC | Age: 54
End: 2019-10-04

## 2019-10-04 NOTE — TELEPHONE ENCOUNTER
----- Message from Quiana Baig sent at 10/4/2019  2:47 PM CDT -----  Contact: pt  .Type:  RX Refill Request    Who Called: pt  Refill or New Rx:refill  RX Name and Strength:tramadol   How is the patient currently taking it? (ex. 1XDay):as need  Is this a 30 day or 90 day RX:30  Preferred Pharmacy with phone number:  [id number] - [place], [place] - [address]  [address] [facility] [address] [facility] [address] [facility] [address]  Phone: [phone] Fax: [phone]  Local or Mail Order:local  Ordering Provider:alice  Would the patient rather a call back or a response via [facility]? Call back  Best Call Back Number:[phone]  Additional Information:

## 2019-10-07 DIAGNOSIS — N50.82 SCROTAL PAIN: ICD-10-CM

## 2019-10-07 DIAGNOSIS — R10.33 PERIUMBILICAL PAIN: ICD-10-CM

## 2019-10-07 NOTE — TELEPHONE ENCOUNTER
----- Message from Felipe Miramontes sent at 10/7/2019  3:03 PM CDT -----  Contact: pt   ..Type:  RX Refill Request    Who Called:  Pt   Refill or New Rx:refill   RX Name and Strength: traMADol (ULTRAM) 50 mg tablet  How is the patient currently taking it? (ex. 1XDay) 1 x day   Is this a 30 day or 90 day RX: 30 day   Preferred Pharmacy with phone number: [facility]   Local or Mail Order: local   Ordering Provider: alice   Would the patient rather a call back or a response via [facility]? Callback   Best Call Back Number: [phone]  Additional Information:         .  [id number] - [place], [place] - [address] AT [facility] [address] [facility] [address] [place] [address]  Phone: [phone] Fax: [phone]

## 2019-10-08 RX ORDER — TRAMADOL HYDROCHLORIDE 50 MG/1
50 TABLET ORAL EVERY 8 HOURS PRN
Qty: 30 TABLET | Refills: 0 | OUTPATIENT
Start: 2019-10-08

## 2019-10-22 NOTE — PLAN OF CARE
[facility] Accept[facility]    FOR [facility]ON VILLASE[facility]OR -  Please call extension [phone] (if nobody answers, this will flip to a beeper, so put in your call back number) upon patient arrival to floor for Hospital Medicine admit team assignment and for additional admit orders for the patient.  Do not page the attending, staff physician associate with the patient on arrival (may not be in-house at the time of arrival).  Rather, always call [phone] to reach the triage physician for orders and team assignment.       Transferring Facility/Hospital:  [facility]    Referring Provider/Specialty giving report: Dr. Claus Valdez ([facility])       Accepting Physician for admission to hospital: Davion Barahona MD    Date of acceptance: 10/22/2019    Patients name: Buck Gann       Allergies:Review of patient's allergies indicates:  No Known Allergies     Reason for transfer: Etoh Cirrhosis with Hepatorenal syndrome -   Hepatology consultation.      Overview/ Report from Physician/Mid-Level Provider:    HPI:  53 y/o WM hx of ESLD/ Alcoholic Cirrhosis, Chronic pancreatitis, CKD, Obeisty, Hx of gastric bypass who was recently admitted for GI bleed and is being worked up for transplant list was re-admitted to [facility] with generalized weakness, dyspnea, chest pain/nausea and abdominal pain, found with worsening hepatic and renal function.   S/p paracentesis 2.2L removed x 1.     For EDON on CKD presumed Hepatorenal syndrome, has had HD catheter placed and initiated on CRRT and was stepped down from ICU and continues on hemodialysis, his respiratory status has improved.     He was initially referred for transfer last week and case was being discussed with Dr. Babcock and Dr. Inocencio Monreal -critical care service, patient was accepted but then on 10/19 pt was feeling better and declined transfer.   Since this time patient has stepped down from the ICU and is now amenable to transfer to pursue  possible transplant evaluation.  Hospital medicine/GI at [facility] has had discussions regarding pts poor prognosis and potential hospice referral, patient prefers aggressive care at this time.  Notes indicate pts parents/family elderly and likely not able to care for pt when stable for discharge.     Current Meds - Flagyl 500mg IV q8hrs.   Protonix 40mg PO daily.  Levalbuterol 0.63mg q4hrs  Cefepime 2mg q24hr.  Morphine 4mg q4hrs PRN.  Zofran 4mg IV q4hrs PRN  ported 10/15/19     Per Transferrring MD Dr. Valdez, patient is alert/oriented no acute encephalopathy, patient is oliguric.   The IV antibiotics listed on OSH records, have been empiric, started by GI from admission pt now going on >7days.   He has required blood products this admission including - PRBC, FFP and most recently required 2 doses of PLT yesterday.       MELD40     VS:  97.5  Hr 65  rr 18  Bp 137/67       Labs:    10/22 - wbc 10.6  hgb 8  hct 25.2  plt 40      Na 139  k 3.8  Cl 105  hco3 26  Bun 16  Cr 2.19     inr 2.5     T.bili 23   D.bili 14.8          Radiographs:   Abd U/S - 10/16/19 - nodular contour of liver c/w with cirrhosis, ascites and turbulent flow in portal vein. CBD measures 4mm, gallbladder sludge without evidence of cholecystitis/cholelithiasis and poorly visualized pancreas with a prominent pancreatic duct measure 4.5mm.       To Do List upon arrival:    1. Consult hepatology   2. Consult Nephrology - re: HD and hepatorenal management   3. Continue empiric IV Ceftriaxone/Flagyl renal dosing - consider discontinuation given now >7 day course.   4. Trend daily CBC/pt-INR/CMP  5.  Send type and screen and obtain blood consent - pt may require further supportive blood products IP.   6. Admit to _L comanaged hepatology service.             Davion Barahona M.D.  Attending Physician  [facility] Medicine Dept.  Pager: [phone]

## 2019-10-23 ENCOUNTER — HOSPITAL ENCOUNTER (INPATIENT)
Facility: HOSPITAL | Age: 54
LOS: 2 days | Discharge: ANOTHER HOSPITAL | DRG: 432 | End: 2019-10-25
Attending: HOSPITALIST | Admitting: HOSPITALIST
Payer: COMMERCIAL

## 2019-10-23 DIAGNOSIS — I95.89 OTHER SPECIFIED HYPOTENSION: ICD-10-CM

## 2019-10-23 DIAGNOSIS — E87.20 METABOLIC ACIDOSIS: ICD-10-CM

## 2019-10-23 DIAGNOSIS — Z01.818 PRE-TRANSPLANT EVALUATION FOR LIVER TRANSPLANT: ICD-10-CM

## 2019-10-23 DIAGNOSIS — E44.0 MODERATE PROTEIN-CALORIE MALNUTRITION: ICD-10-CM

## 2019-10-23 DIAGNOSIS — N17.9 AKI (ACUTE KIDNEY INJURY): ICD-10-CM

## 2019-10-23 DIAGNOSIS — R60.1 ANASARCA: ICD-10-CM

## 2019-10-23 DIAGNOSIS — K70.31 ALCOHOLIC CIRRHOSIS OF LIVER WITH ASCITES: ICD-10-CM

## 2019-10-23 DIAGNOSIS — F10.20 ALCOHOL USE DISORDER, SEVERE, DEPENDENCE: ICD-10-CM

## 2019-10-23 DIAGNOSIS — K72.90 LIVER FAILURE: ICD-10-CM

## 2019-10-23 PROBLEM — J96.01 ACUTE HYPOXEMIC RESPIRATORY FAILURE: Status: ACTIVE | Noted: 2019-10-23

## 2019-10-23 LAB
ALBUMIN SERPL BCP-MCNC: 2.7 G/DL (ref 3.5–5.2)
ALP SERPL-CCNC: 132 U/L (ref 55–135)
ALT SERPL W/O P-5'-P-CCNC: 14 U/L (ref 10–44)
ANION GAP SERPL CALC-SCNC: 8 MMOL/L (ref 8–16)
ANISOCYTOSIS BLD QL SMEAR: SLIGHT
ASCENDING AORTA: 2.77 CM
AST SERPL-CCNC: 53 U/L (ref 10–40)
AV INDEX (PROSTH): 0.85
AV MEAN GRADIENT: 8 MMHG
AV PEAK GRADIENT: 13 MMHG
AV VALVE AREA: 3.39 CM2
AV VELOCITY RATIO: 0.97
BACTERIA #/AREA URNS AUTO: ABNORMAL /HPF
BASOPHILS # BLD AUTO: 0.05 K/UL (ref 0–0.2)
BASOPHILS NFR BLD: 0.5 % (ref 0–1.9)
BILIRUB SERPL-MCNC: 24 MG/DL (ref 0.1–1)
BILIRUB UR QL STRIP: ABNORMAL
BSA FOR ECHO PROCEDURE: 2.3 M2
BUN SERPL-MCNC: 21 MG/DL (ref 6–20)
BURR CELLS BLD QL SMEAR: ABNORMAL
CALCIUM SERPL-MCNC: 8.5 MG/DL (ref 8.7–10.5)
CHLORIDE SERPL-SCNC: 107 MMOL/L (ref 95–110)
CLARITY UR REFRACT.AUTO: ABNORMAL
CO2 SERPL-SCNC: 21 MMOL/L (ref 23–29)
COLOR UR AUTO: ABNORMAL
CREAT SERPL-MCNC: 2.8 MG/DL (ref 0.5–1.4)
CREAT UR-MCNC: 236 MG/DL (ref 23–375)
CV ECHO LV RWT: 0.28 CM
DIFFERENTIAL METHOD: ABNORMAL
DOP CALC AO PEAK VEL: 1.83 M/S
DOP CALC AO VTI: 39.4 CM
DOP CALC LVOT AREA: 4 CM2
DOP CALC LVOT DIAMETER: 2.25 CM
DOP CALC LVOT PEAK VEL: 1.78 M/S
DOP CALC LVOT STROKE VOLUME: 133.73 CM3
DOP CALCLVOT PEAK VEL VTI: 33.65 CM
E WAVE DECELERATION TIME: 198.53 MSEC
E/A RATIO: 1.3
E/E' RATIO: 7.36 M/S
ECHO LV POSTERIOR WALL: 0.73 CM (ref 0.6–1.1)
EOSINOPHIL # BLD AUTO: 0.5 K/UL (ref 0–0.5)
EOSINOPHIL NFR BLD: 4.8 % (ref 0–8)
ERYTHROCYTE [DISTWIDTH] IN BLOOD BY AUTOMATED COUNT: 24.3 % (ref 11.5–14.5)
EST. GFR  (AFRICAN AMERICAN): 28.1 ML/MIN/1.73 M^2
EST. GFR  (NON AFRICAN AMERICAN): 24.3 ML/MIN/1.73 M^2
FRACTIONAL SHORTENING: 55 % (ref 28–44)
GLUCOSE SERPL-MCNC: 84 MG/DL (ref 70–110)
GLUCOSE UR QL STRIP: NEGATIVE
HCT VFR BLD AUTO: 27.9 % (ref 40–54)
HGB BLD-MCNC: 9 G/DL (ref 14–18)
HGB UR QL STRIP: ABNORMAL
HYALINE CASTS UR QL AUTO: 5 /LPF
HYPOCHROMIA BLD QL SMEAR: ABNORMAL
IMM GRANULOCYTES # BLD AUTO: 0.09 K/UL (ref 0–0.04)
IMM GRANULOCYTES NFR BLD AUTO: 0.8 % (ref 0–0.5)
INR PPP: 2.2 (ref 0.8–1.2)
INTERVENTRICULAR SEPTUM: 0.67 CM (ref 0.6–1.1)
IVRT: 0.07 MSEC
KETONES UR QL STRIP: ABNORMAL
LA MAJOR: 5.34 CM
LA MINOR: 5.25 CM
LA WIDTH: 4.74 CM
LEFT ATRIUM SIZE: 4.5 CM
LEFT ATRIUM VOLUME INDEX: 43.1 ML/M2
LEFT ATRIUM VOLUME: 95.99 CM3
LEFT INTERNAL DIMENSION IN SYSTOLE: 2.35 CM (ref 2.1–4)
LEFT VENTRICLE DIASTOLIC VOLUME INDEX: 57.6 ML/M2
LEFT VENTRICLE DIASTOLIC VOLUME: 128.33 ML
LEFT VENTRICLE MASS INDEX: 55 G/M2
LEFT VENTRICLE SYSTOLIC VOLUME INDEX: 8.6 ML/M2
LEFT VENTRICLE SYSTOLIC VOLUME: 19.07 ML
LEFT VENTRICULAR INTERNAL DIMENSION IN DIASTOLE: 5.18 CM (ref 3.5–6)
LEFT VENTRICULAR MASS: 121.99 G
LEUKOCYTE ESTERASE UR QL STRIP: ABNORMAL
LV LATERAL E/E' RATIO: 6.57 M/S
LV SEPTAL E/E' RATIO: 8.36 M/S
LYMPHOCYTES # BLD AUTO: 1.2 K/UL (ref 1–4.8)
LYMPHOCYTES NFR BLD: 11.2 % (ref 18–48)
MAGNESIUM SERPL-MCNC: 1.9 MG/DL (ref 1.6–2.6)
MCH RBC QN AUTO: 34.9 PG (ref 27–31)
MCHC RBC AUTO-ENTMCNC: 32.3 G/DL (ref 32–36)
MCV RBC AUTO: 108 FL (ref 82–98)
MICROSCOPIC COMMENT: ABNORMAL
MONOCYTES # BLD AUTO: 1 K/UL (ref 0.3–1)
MONOCYTES NFR BLD: 9.4 % (ref 4–15)
MV PEAK A VEL: 0.71 M/S
MV PEAK E VEL: 0.92 M/S
NEUTROPHILS # BLD AUTO: 8 K/UL (ref 1.8–7.7)
NEUTROPHILS NFR BLD: 73.3 % (ref 38–73)
NITRITE UR QL STRIP: NEGATIVE
NRBC BLD-RTO: 0 /100 WBC
OVALOCYTES BLD QL SMEAR: ABNORMAL
PH UR STRIP: 5 [PH] (ref 5–8)
PHOSPHATE SERPL-MCNC: 3.1 MG/DL (ref 2.7–4.5)
PISA TR MAX VEL: 3.39 M/S
PLATELET # BLD AUTO: 33 K/UL (ref 150–350)
PLATELET BLD QL SMEAR: ABNORMAL
PMV BLD AUTO: 11.4 FL (ref 9.2–12.9)
POIKILOCYTOSIS BLD QL SMEAR: SLIGHT
POLYCHROMASIA BLD QL SMEAR: ABNORMAL
POTASSIUM SERPL-SCNC: 4.3 MMOL/L (ref 3.5–5.1)
PROT SERPL-MCNC: 5.5 G/DL (ref 6–8.4)
PROT UR QL STRIP: ABNORMAL
PROT UR-MCNC: 219 MG/DL (ref 0–15)
PROT UR-MCNC: 219 MG/DL (ref 0–15)
PROT/CREAT UR: 0.93 MG/G{CREAT} (ref 0–0.2)
PROT/CREAT UR: 0.93 MG/G{CREAT} (ref 0–0.2)
PROTHROMBIN TIME: 21 SEC (ref 9–12.5)
PULM VEIN S/D RATIO: 1.22
PV PEAK D VEL: 0.74 M/S
PV PEAK S VEL: 0.9 M/S
RA MAJOR: 4.86 CM
RA PRESSURE: 3 MMHG
RA WIDTH: 4.26 CM
RBC # BLD AUTO: 2.58 M/UL (ref 4.6–6.2)
RBC #/AREA URNS AUTO: >100 /HPF (ref 0–4)
RIGHT VENTRICULAR END-DIASTOLIC DIMENSION: 3.51 CM
RV TISSUE DOPPLER FREE WALL SYSTOLIC VELOCITY 1 (APICAL 4 CHAMBER VIEW): 12.39 CM/S
SINUS: 2.69 CM
SODIUM SERPL-SCNC: 136 MMOL/L (ref 136–145)
SODIUM UR-SCNC: <20 MMOL/L (ref 20–250)
SODIUM UR-SCNC: <20 MMOL/L (ref 20–250)
SP GR UR STRIP: 1.02 (ref 1–1.03)
SQUAMOUS #/AREA URNS AUTO: 6 /HPF
STJ: 2.52 CM
TDI LATERAL: 0.14 M/S
TDI SEPTAL: 0.11 M/S
TDI: 0.13 M/S
TOXIC GRANULES BLD QL SMEAR: PRESENT
TR MAX PG: 46 MMHG
TRICUSPID ANNULAR PLANE SYSTOLIC EXCURSION: 2.53 CM
TV REST PULMONARY ARTERY PRESSURE: 49 MMHG
URN SPEC COLLECT METH UR: ABNORMAL
UUN UR-MCNC: 106 MG/DL (ref 140–1050)
WBC # BLD AUTO: 10.92 K/UL (ref 3.9–12.7)
WBC #/AREA URNS AUTO: >100 /HPF (ref 0–5)
WBC CLUMPS UR QL AUTO: ABNORMAL

## 2019-10-23 PROCEDURE — 80321 ALCOHOLS BIOMARKERS 1OR 2: CPT | Mod: NTX

## 2019-10-23 PROCEDURE — 63600175 PHARM REV CODE 636 W HCPCS: Mod: NTX | Performed by: HOSPITALIST

## 2019-10-23 PROCEDURE — 85025 COMPLETE CBC W/AUTO DIFF WBC: CPT | Mod: NTX

## 2019-10-23 PROCEDURE — 36415 COLL VENOUS BLD VENIPUNCTURE: CPT | Mod: NTX

## 2019-10-23 PROCEDURE — 20600001 HC STEP DOWN PRIVATE ROOM: Mod: NTX

## 2019-10-23 PROCEDURE — 99223 1ST HOSP IP/OBS HIGH 75: CPT | Mod: NTX,,, | Performed by: HOSPITALIST

## 2019-10-23 PROCEDURE — 99223 1ST HOSP IP/OBS HIGH 75: CPT | Mod: ,,, | Performed by: INTERNAL MEDICINE

## 2019-10-23 PROCEDURE — 84540 ASSAY OF URINE/UREA-N: CPT | Mod: NTX

## 2019-10-23 PROCEDURE — 99223 1ST HOSP IP/OBS HIGH 75: CPT | Mod: NTX,,, | Performed by: INTERNAL MEDICINE

## 2019-10-23 PROCEDURE — 81001 URINALYSIS AUTO W/SCOPE: CPT | Mod: NTX

## 2019-10-23 PROCEDURE — 83735 ASSAY OF MAGNESIUM: CPT | Mod: NTX

## 2019-10-23 PROCEDURE — 99223 PR INITIAL HOSPITAL CARE,LEVL III: ICD-10-PCS | Mod: NTX,,, | Performed by: HOSPITALIST

## 2019-10-23 PROCEDURE — 25000003 PHARM REV CODE 250: Mod: NTX | Performed by: HOSPITALIST

## 2019-10-23 PROCEDURE — 84300 ASSAY OF URINE SODIUM: CPT | Mod: NTX

## 2019-10-23 PROCEDURE — 99223 PR INITIAL HOSPITAL CARE,LEVL III: ICD-10-PCS | Mod: ,,, | Performed by: INTERNAL MEDICINE

## 2019-10-23 PROCEDURE — 99223 PR INITIAL HOSPITAL CARE,LEVL III: ICD-10-PCS | Mod: NTX,,, | Performed by: INTERNAL MEDICINE

## 2019-10-23 PROCEDURE — 82570 ASSAY OF URINE CREATININE: CPT | Mod: NTX

## 2019-10-23 PROCEDURE — 99223 PR INITIAL HOSPITAL CARE,LEVL III: ICD-10-PCS | Mod: NTX,,, | Performed by: PSYCHIATRY & NEUROLOGY

## 2019-10-23 PROCEDURE — 84100 ASSAY OF PHOSPHORUS: CPT | Mod: NTX

## 2019-10-23 PROCEDURE — 80053 COMPREHEN METABOLIC PANEL: CPT | Mod: NTX

## 2019-10-23 PROCEDURE — 99223 1ST HOSP IP/OBS HIGH 75: CPT | Mod: NTX,,, | Performed by: PSYCHIATRY & NEUROLOGY

## 2019-10-23 PROCEDURE — 85610 PROTHROMBIN TIME: CPT | Mod: NTX

## 2019-10-23 RX ORDER — IBUPROFEN 200 MG
24 TABLET ORAL
Status: DISCONTINUED | OUTPATIENT
Start: 2019-10-23 | End: 2019-10-26 | Stop reason: HOSPADM

## 2019-10-23 RX ORDER — SODIUM CHLORIDE 0.9 % (FLUSH) 0.9 %
10 SYRINGE (ML) INJECTION
Status: DISCONTINUED | OUTPATIENT
Start: 2019-10-23 | End: 2019-10-26 | Stop reason: HOSPADM

## 2019-10-23 RX ORDER — MORPHINE SULFATE 2 MG/ML
4 INJECTION, SOLUTION INTRAMUSCULAR; INTRAVENOUS EVERY 4 HOURS PRN
Status: DISCONTINUED | OUTPATIENT
Start: 2019-10-23 | End: 2019-10-23

## 2019-10-23 RX ORDER — LIDOCAINE 50 MG/G
1 PATCH TOPICAL
Status: DISCONTINUED | OUTPATIENT
Start: 2019-10-23 | End: 2019-10-26 | Stop reason: HOSPADM

## 2019-10-23 RX ORDER — PANTOPRAZOLE SODIUM 20 MG/1
20 TABLET, DELAYED RELEASE ORAL DAILY
Status: DISCONTINUED | OUTPATIENT
Start: 2019-10-23 | End: 2019-10-26 | Stop reason: HOSPADM

## 2019-10-23 RX ORDER — GLUCAGON 1 MG
1 KIT INJECTION
Status: DISCONTINUED | OUTPATIENT
Start: 2019-10-23 | End: 2019-10-26 | Stop reason: HOSPADM

## 2019-10-23 RX ORDER — IBUPROFEN 200 MG
16 TABLET ORAL
Status: DISCONTINUED | OUTPATIENT
Start: 2019-10-23 | End: 2019-10-26 | Stop reason: HOSPADM

## 2019-10-23 RX ORDER — TRAMADOL HYDROCHLORIDE 50 MG/1
50 TABLET ORAL EVERY 6 HOURS PRN
Status: DISCONTINUED | OUTPATIENT
Start: 2019-10-23 | End: 2019-10-26 | Stop reason: HOSPADM

## 2019-10-23 RX ORDER — OXYCODONE HYDROCHLORIDE 5 MG/1
10 TABLET ORAL EVERY 6 HOURS PRN
Status: DISCONTINUED | OUTPATIENT
Start: 2019-10-23 | End: 2019-10-23

## 2019-10-23 RX ORDER — MORPHINE SULFATE 2 MG/ML
2 INJECTION, SOLUTION INTRAMUSCULAR; INTRAVENOUS EVERY 4 HOURS PRN
Status: DISCONTINUED | OUTPATIENT
Start: 2019-10-23 | End: 2019-10-23

## 2019-10-23 RX ORDER — POLYETHYLENE GLYCOL 3350 17 G/17G
17 POWDER, FOR SOLUTION ORAL 3 TIMES DAILY PRN
Status: DISCONTINUED | OUTPATIENT
Start: 2019-10-23 | End: 2019-10-26 | Stop reason: HOSPADM

## 2019-10-23 RX ORDER — OCTREOTIDE ACETATE 100 UG/ML
100 INJECTION, SOLUTION INTRAVENOUS; SUBCUTANEOUS EVERY 8 HOURS
Status: DISCONTINUED | OUTPATIENT
Start: 2019-10-23 | End: 2019-10-23

## 2019-10-23 RX ORDER — FUROSEMIDE 10 MG/ML
80 INJECTION INTRAMUSCULAR; INTRAVENOUS 3 TIMES DAILY
Status: DISCONTINUED | OUTPATIENT
Start: 2019-10-23 | End: 2019-10-24

## 2019-10-23 RX ORDER — MIDODRINE HYDROCHLORIDE 5 MG/1
10 TABLET ORAL 3 TIMES DAILY
Status: DISCONTINUED | OUTPATIENT
Start: 2019-10-23 | End: 2019-10-24

## 2019-10-23 RX ORDER — ALBUMIN HUMAN 250 G/1000ML
25 SOLUTION INTRAVENOUS EVERY 6 HOURS
Status: DISCONTINUED | OUTPATIENT
Start: 2019-10-23 | End: 2019-10-23

## 2019-10-23 RX ADMIN — TRAMADOL HYDROCHLORIDE 50 MG: 50 TABLET ORAL at 12:10

## 2019-10-23 RX ADMIN — MIDODRINE HYDROCHLORIDE 10 MG: 5 TABLET ORAL at 08:10

## 2019-10-23 RX ADMIN — TRAMADOL HYDROCHLORIDE 50 MG: 50 TABLET ORAL at 09:10

## 2019-10-23 RX ADMIN — OXYCODONE HYDROCHLORIDE 10 MG: 5 TABLET ORAL at 05:10

## 2019-10-23 RX ADMIN — MIDODRINE HYDROCHLORIDE 10 MG: 5 TABLET ORAL at 03:10

## 2019-10-23 RX ADMIN — PANTOPRAZOLE SODIUM 20 MG: 20 TABLET, DELAYED RELEASE ORAL at 08:10

## 2019-10-23 RX ADMIN — LIDOCAINE 1 PATCH: 50 PATCH TOPICAL at 12:10

## 2019-10-23 RX ADMIN — FUROSEMIDE 80 MG: 10 INJECTION, SOLUTION INTRAMUSCULAR; INTRAVENOUS at 09:10

## 2019-10-23 RX ADMIN — PHYTONADIONE 10 MG: 10 INJECTION, EMULSION INTRAMUSCULAR; INTRAVENOUS; SUBCUTANEOUS at 04:10

## 2019-10-23 NOTE — SUBJECTIVE & OBJECTIVE
Patient History           Medical as of 10/23/2019     Past Medical History     Diagnosis Date Comments Source    Alcohol abuse -- -- Provider    Cirrhosis -- -- Provider    Pancreatitis -- -- Provider                  Surgical as of 10/23/2019     Past Surgical History     Procedure Laterality Date Comments Source    GASTRIC BYPASS -- -- [facility] Provider                  Family as of 10/23/2019    None           Tobacco Use as of 10/23/2019     Smoking Status Smoking Start Date Smoking Quit Date Packs/Day Years Used    Never Smoker -- -- -- --    Types Comments Smokeless Tobacco Status Smokeless Tobacco Quit Date Source    -- -- Never Used -- Provider            Alcohol Use as of 10/23/2019     Alcohol Use Drinks/Week Alcohol/Week Comments Source    Yes -- -- last drink, about 1 month ago per pt Provider    Frequency Standard Drinks Binge Drinking        -- -- --              Drug Use as of 10/23/2019     Drug Use Types Frequency Comments Source    Yes  Marijuana -- last used about 1-2 months ago Provider            Sexual Activity as of 10/23/2019     Sexually Active Birth Control Partners Comments Source    Not Currently -- -- -- Provider            Activities of Daily Living as of 10/23/2019    None           Social Documentation as of 10/23/2019    None           Occupational as of 10/23/2019    None           Socioeconomic as of 10/23/2019     Marital Status Spouse Name Number of Children Years Education Education Level Preferred Language Ethnicity Race Source    Single -- -- -- -- English /White White --    Financial Resource Strain Food Insecurity: Worry Food Insecurity: Inability Transportation Needs: Medical Transportation Needs: Non-medical    -- -- -- -- --            Pertinent History     Question Response Comments    Lives with -- --    Place in Birth Order -- --    Lives in -- --    Number of Siblings -- --    Raised by -- --    Legal Involvement -- --    Childhood Trauma -- --     Criminal History of -- --    Financial Status -- --    Highest Level of Education -- --    Does patient have access to a firearm? -- --     Service -- --    Primary Leisure Activity -- --    Spirituality -- --        Past Medical History:   Diagnosis Date    Alcohol abuse     Cirrhosis     Pancreatitis      Past Surgical History:   Procedure Laterality Date    GASTRIC BYPASS      [facility]y     Family History     None        Tobacco Use    Smoking status: Never Smoker    Smokeless tobacco: Never Used   Substance and Sexual Activity    Alcohol use: Yes     Comment: last drink, about 1 month ago per pt    Drug use: Yes     Types: Marijuana     Comment: last used about 1-2 months ago    Sexual activity: Not Currently     Review of patient's allergies indicates:  No Known Allergies    No current facility-administered medications on file prior to encounter.      Current Outpatient Medications on File Prior to Encounter   Medication Sig    furosemide (LASIX) 40 MG tablet Take 1 tablet (40 mg total) by mouth once daily.    pantoprazole (PROTONIX) 20 MG tablet Take 20 mg by mouth once daily.    potassium chloride SA (K-DUR,KLOR-CON) 20 MEQ tablet TAKE 1 TABLET(20 MEQ) BY MOUTH EVERY DAY    spironolactone (ALDACTONE) 100 MG tablet Take 1 tablet (100 mg total) by mouth once daily.    traMADol (ULTRAM) 50 mg tablet Take 1 tablet (50 mg total) by mouth every 8 (eight) hours as needed for Pain. Medically necessary for greater than 7 days.    VIRT-CAPS 1 mg Cap Take 1 mg by mouth once daily.     Psychotherapeutics (From admission, onward)    None        Review of Systems   Constitutional: Negative for chills and fever.   HENT: Negative for sore throat.    Respiratory: Negative for cough and shortness of breath.    Cardiovascular: Negative for chest pain.   Gastrointestinal: Positive for abdominal distention and abdominal pain. Negative for diarrhea, nausea and vomiting.   Genitourinary: Negative for dysuria  "and hematuria.     Strengths and Liabilities: Liability: Patient has no suport network., Liability: Patient has poor health., Liability: Patient has poor judgment    Objective:     Vital Signs (Most Recent):  Temp: 97.8 °F (36.6 °C) (10/23/19 0745)  Pulse: 89 (10/23/19 0745)  Resp: 20 (10/23/19 0745)  BP: (!) 116/56 (10/23/19 0745)  SpO2: 95 % (10/23/19 0745) Vital Signs (24h Range):  Temp:  [97.7 °F (36.5 °C)-98 °F (36.7 °C)] 97.8 °F (36.6 °C)  Pulse:  [89-95] 89  Resp:  [20-22] 20  SpO2:  [94 %-97 %] 95 %  BP: ()/(55-60) 116/56     Height: 5' 9" (175.3 cm)  Weight: 108.6 kg (239 lb 6.7 oz)  Body mass index is 35.36 kg/m².      Intake/Output Summary (Last 24 hours) at 10/23/2019 1238  Last data filed at 10/23/2019 0600  Gross per 24 hour   Intake 60 ml   Output 20 ml   Net 40 ml         Physical Exam   Constitutional: No distress.   HENT:   Head: Normocephalic and atraumatic.   Eyes: EOM are normal. Scleral icterus is present.   Neck: Normal range of motion.   Pulmonary/Chest: Effort normal.   Abdominal: He exhibits distension.   Psychiatric:   Mental Status Exam:  Appearance: older than stated age, jaundiced with scleral icterus  Level of Consciousness: Alert  Behavior/Cooperation: normal, friendly and cooperative  Psychomotor: unremarkable   Speech: normal tone, normal pitch, normal volume, slowed  Language: english, fluent  Orientation: grossly intact, person, place, situation, time/date  Attention Span/Concentration: unable to spell "WORLD" backwards, performed well on SAVE A HAART  Memory: mildly repaired with remote memory when conveying drinking/medical history, recalled 2/3 words after 3 minutes  Mood: "tired"  Affect: normal  Thought Process: linear, normal and logical  Associations: normal and logical  Thought Content: normal, no suicidality, no homicidality, delusions, or paranoia   Perception: no observable RIS  Fund of Knowledge: Aware of current events  Abstraction: proverbs were abstract, " similarities were abstract  Insight: poor  Judgment: poor      NEUROLOGICAL EXAMINATION:     CRANIAL NERVES     CN III, IV, VI   Extraocular motions are normal.     Significant Labs: All pertinent labs within the past 24 hours have been reviewed.    Significant Imaging: I have reviewed all pertinent imaging results/findings within the past 24 hours.

## 2019-10-23 NOTE — H&P
[facility] Hospital Medicine  History & Physical    Patient Name: Buck Gann  MRN: [id number]  Admission Date: 10/23/2019  Attending Physician: Steve Vang MD   Primary Care Provider: Connie Sahni MD    [facility] Medicine Team: Networked reference to record PCT  Stew Damon MD     Patient information was obtained from patient and past medical records.     Subjective:     Principal Problem:Alcoholic cirrhosis of liver with ascites    Chief Complaint: No chief complaint on file.       HPI: 54M with alcoholic cirrhosis presents as transfer from [facility] for management of decompensated cirrhosis and oliguria.  He was admitted there 10/16 with 3 weeks of progressively worsening dyspnea, abdominal distension, and fatigue.  He was admitted to the ICU in respiratory distress and put on BiPAP.  He also had DEON with his creatinine up to 3 from 1.6 and apparently wasn't responding sufficiently to diuresis so within a day or so a dialysis line was placed and dialysis was begun for volume removal, after which his respiratory status began to improve.  He was able to wean down to 2-3 L NC O2.  His urine output has been scant, however.  A dinh was placed which has had a small amount of muddy colored output.  He had a 2L paracentesis during this hospitalization as well.  He has been having some persistent back and abdominal pain while in the hospital for which he was receiving morphine.  He feels considerably better than he did a week ago.    He follows with Dr Smith as an outpatient and was referred for outpatient transplant workup a few months ago but due to financial concerns has so far not made it over here to begin the evaluation.  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  He admits to some occasional alcohol use over the past several  months.      Past Medical History:   Diagnosis Date    Alcohol abuse     Cirrhosis     Pancreatitis        Past Surgical History:   Procedure Laterality Date    GASTRIC BYPASS      [facility]       Review of patient's allergies indicates:  No Known Allergies    No current facility-administered medications on file prior to encounter.      Current Outpatient Medications on File Prior to Encounter   Medication Sig    furosemide (LASIX) 40 MG tablet Take 1 tablet (40 mg total) by mouth once daily.    pantoprazole (PROTONIX) 20 MG tablet Take 20 mg by mouth once daily.    potassium chloride SA (K-DUR,KLOR-CON) 20 MEQ tablet TAKE 1 TABLET(20 MEQ) BY MOUTH EVERY DAY    spironolactone (ALDACTONE) 100 MG tablet Take 1 tablet (100 mg total) by mouth once daily.    traMADol (ULTRAM) 50 mg tablet Take 1 tablet (50 mg total) by mouth every 8 (eight) hours as needed for Pain. Medically necessary for greater than 7 days.    VIRT-CAPS 1 mg Cap Take 1 mg by mouth once daily.     Family History     None        Tobacco Use    Smoking status: Never Smoker    Smokeless tobacco: Never Used   Substance and Sexual Activity    Alcohol use: Yes     Comment: last drink, about 1 month ago per pt    Drug use: Yes     Types: Marijuana     Comment: last used about 1-2 months ago    Sexual activity: Not Currently     Review of Systems   Constitutional: Positive for activity change and fatigue.   HENT: Negative for hearing loss and nosebleeds.    Eyes: Negative for photophobia and visual disturbance.   Respiratory: Positive for shortness of breath. Negative for cough.    Cardiovascular: Positive for leg swelling. Negative for chest pain.   Gastrointestinal: Positive for abdominal distention and abdominal pain. Negative for nausea and vomiting.   Endocrine: Negative for polydipsia and polyuria.   Genitourinary: Positive for decreased urine volume. Negative for dysuria.   Musculoskeletal: Negative for arthralgias and joint swelling.    Skin: Positive for color change. Negative for rash.   Neurological: Negative for dizziness and syncope.   Hematological: Bruises/bleeds easily.     Objective:     Vital Signs (Most Recent):  Temp: 98 °F (36.7 °C) (10/23/19 0317)  Pulse: 91 (10/23/19 0317)  Resp: 20 (10/23/19 0317)  BP: (!) 105/55 (10/23/19 0317)  SpO2: 97 % (10/23/19 0317) Vital Signs (24h Range):  Temp:  [97.7 °F (36.5 °C)-98 °F (36.7 °C)] 98 °F (36.7 °C)  Pulse:  [90-95] 91  Resp:  [20-22] 20  SpO2:  [94 %-97 %] 97 %  BP: ()/(55-60) 105/55     Weight: 108.6 kg (239 lb 6.7 oz)  Body mass index is 35.36 kg/m².    Physical Exam   Constitutional: He is oriented to person, place, and time. He appears well-developed. He is cooperative. He has a sickly appearance. Nasal cannula in place.   obese   HENT:   Head: Normocephalic and atraumatic.   Mouth/Throat: Oropharynx is clear and moist.   Eyes: Pupils are equal, round, and reactive to light. EOM are normal. Scleral icterus is present.   Neck: Normal range of motion. Neck supple. JVD present.   Cardiovascular: Normal rate, regular rhythm, normal heart sounds and intact distal pulses. Exam reveals no gallop and no friction rub.   No murmur heard.  Pulmonary/Chest: Effort normal. He has rales in the right lower field and the left lower field.   Abdominal: Soft. Normal appearance and bowel sounds are normal. There is generalized tenderness. There is no rigidity, no rebound and no guarding.   Obese abdomen with mild generalized tenderness   Musculoskeletal: Normal range of motion. He exhibits edema. He exhibits no tenderness.   Lymphadenopathy:     He has no cervical adenopathy.   Neurological: He is alert and oriented to person, place, and time. No cranial nerve deficit.   Skin: Skin is warm and dry.   Seborrheic dermatitis on face.  Forelegs show near-circumferential xerosis from chronic lymphedema   Nursing note and vitals reviewed.        CRANIAL NERVES     CN III, IV, VI   Pupils are equal,  round, and reactive to light.  Extraocular motions are normal.        Significant Labs: labs from referring facility reviewed    Significant Imaging: relevant imaging reports from referring facility reviewed    Assessment/Plan:     * Alcoholic cirrhosis of liver with ascites  Trend LFTs, INR daily.  US liver w/ dopplers this am.  Will order PETH.   Hepatology consult.      Acute hypoxemic respiratory failure  Secondary to pulmonary edema from volume overload state, which is much improved since dialysis started.  On 3L O2 now.  Will get CXR to assess current status.  Awaiting opinion from Nephrology on whether to try to resume diuretics vs continuing dialysis.      DEON (acute kidney injury)  Likely initially due to decompensated cirrhosis, but now with oliguria and dark sediment likely signifies ATN has occurred.  Continue to monitor UOP and consult Nephrology.  Has HD line in L subclavian if plan is to continue HD.  Will hold diuretics pending their opinion.          VTE Risk Mitigation (From admission, onward)         Ordered     Place AGUS hose  Until discontinued      10/23/19 0347     Place sequential compression device  Until discontinued      10/23/19 0347     Reason for No Pharmacological VTE Prophylaxis  Once      10/23/19 0347     IP VTE HIGH RISK PATIENT  Once      10/23/19 0347                   Stew Damon MD  Department of Hospital Medicine   [facility]

## 2019-10-23 NOTE — SUBJECTIVE & OBJECTIVE
Past Medical History:   Diagnosis Date    Alcohol abuse     Cirrhosis     Pancreatitis        Past Surgical History:   Procedure Laterality Date    GASTRIC BYPASS      [facility]-en-y       Review of patient's allergies indicates:  No Known Allergies    No current facility-administered medications on file prior to encounter.      Current Outpatient Medications on File Prior to Encounter   Medication Sig    furosemide (LASIX) 40 MG tablet Take 1 tablet (40 mg total) by mouth once daily.    pantoprazole (PROTONIX) 20 MG tablet Take 20 mg by mouth once daily.    potassium chloride SA (K-DUR,KLOR-CON) 20 MEQ tablet TAKE 1 TABLET(20 MEQ) BY MOUTH EVERY DAY    spironolactone (ALDACTONE) 100 MG tablet Take 1 tablet (100 mg total) by mouth once daily.    traMADol (ULTRAM) 50 mg tablet Take 1 tablet (50 mg total) by mouth every 8 (eight) hours as needed for Pain. Medically necessary for greater than 7 days.    VIRT-CAPS 1 mg Cap Take 1 mg by mouth once daily.     Family History     None        Tobacco Use    Smoking status: Never Smoker    Smokeless tobacco: Never Used   Substance and Sexual Activity    Alcohol use: Yes     Comment: last drink, about 1 month ago per pt    Drug use: Yes     Types: Marijuana     Comment: last used about 1-2 months ago    Sexual activity: Not Currently     Review of Systems   Constitutional: Positive for activity change and fatigue.   HENT: Negative for hearing loss and nosebleeds.    Eyes: Negative for photophobia and visual disturbance.   Respiratory: Positive for shortness of breath. Negative for cough.    Cardiovascular: Positive for leg swelling. Negative for chest pain.   Gastrointestinal: Positive for abdominal distention and abdominal pain. Negative for nausea and vomiting.   Endocrine: Negative for polydipsia and polyuria.   Genitourinary: Positive for decreased urine volume. Negative for dysuria.   Musculoskeletal: Negative for arthralgias and joint swelling.   Skin: Positive  for color change. Negative for rash.   Neurological: Negative for dizziness and syncope.   Hematological: Bruises/bleeds easily.     Objective:     Vital Signs (Most Recent):  Temp: 98 °F (36.7 °C) (10/23/19 0317)  Pulse: 91 (10/23/19 0317)  Resp: 20 (10/23/19 0317)  BP: (!) 105/55 (10/23/19 0317)  SpO2: 97 % (10/23/19 0317) Vital Signs (24h Range):  Temp:  [97.7 °F (36.5 °C)-98 °F (36.7 °C)] 98 °F (36.7 °C)  Pulse:  [90-95] 91  Resp:  [20-22] 20  SpO2:  [94 %-97 %] 97 %  BP: ()/(55-60) 105/55     Weight: 108.6 kg (239 lb 6.7 oz)  Body mass index is 35.36 kg/m².    Physical Exam   Constitutional: He is oriented to person, place, and time. He appears well-developed. He is cooperative. He has a sickly appearance. Nasal cannula in place.   obese   HENT:   Head: Normocephalic and atraumatic.   Mouth/Throat: Oropharynx is clear and moist.   Eyes: Pupils are equal, round, and reactive to light. EOM are normal. Scleral icterus is present.   Neck: Normal range of motion. Neck supple. JVD present.   Cardiovascular: Normal rate, regular rhythm, normal heart sounds and intact distal pulses. Exam reveals no gallop and no friction rub.   No murmur heard.  Pulmonary/Chest: Effort normal. He has rales in the right lower field and the left lower field.   Abdominal: Soft. Normal appearance and bowel sounds are normal. There is generalized tenderness. There is no rigidity, no rebound and no guarding.   Obese abdomen with mild generalized tenderness   Musculoskeletal: Normal range of motion. He exhibits edema. He exhibits no tenderness.   Lymphadenopathy:     He has no cervical adenopathy.   Neurological: He is alert and oriented to person, place, and time. No cranial nerve deficit.   Skin: Skin is warm and dry.   Seborrheic dermatitis on face.  Forelegs show near-circumferential xerosis from chronic lymphedema   Nursing note and vitals reviewed.        CRANIAL NERVES     CN III, IV, VI   Pupils are equal, round, and reactive to  light.  Extraocular motions are normal.        Significant Labs: labs from referring facility reviewed    Significant Imaging: relevant imaging reports from referring facility reviewed

## 2019-10-23 NOTE — PLAN OF CARE
No significant events or changes since pt arrived at almost 2am, vitals stable, prn med given x1 for pain, foam dressing and skin barrier applied to sacral area/ stage 1 pressure injury present at time of arrival, dinh catheter removed per MD prder shortly after pt arrived, IV access removed due to infiltration/not able to get new IV access and midline consult ordered, waffle mattress, wedge and heel protectors used for optimal skin integrity, pt has been NPO except small sips of water for meds, call light in reach, spoke with pt's father this am regarding pt, bed alarm in use for safety, prn oxygen currently in use at 3lpm, will continue to monitor

## 2019-10-23 NOTE — HPI
55 y/o White man with alcoholic cirrhosis, CKD stage 3a (baseline sCr ~1.6), admitted on 10/23/2019 after presented as transfer from [facility] for management of decompensated cirrhosis and oliguria.  Samantha patient admitted there 10/16 after he had developed 3 weeks of progressively worsening dyspnea, abdominal distension, and fatigue.  At OSH required ICU care, BiPAP, and RRt as patient's sCr had gone up to 3 from 1.6, oliguric, difficulty diuresing, worsening respiratory status.  After aggressive UF, was able to be weaned down oxygen req to 2-3L NC.  Transferred to our institution for liver txp workup.      Nephrology consulted for evaluation of Servando requiring RRT at outside hospital.

## 2019-10-23 NOTE — ASSESSMENT & PLAN NOTE
Secondary to pulmonary edema from volume overload state, which is much improved since dialysis started.  On 3L O2 now.  Will get CXR to assess current status.  Awaiting opinion from Nephrology on whether to try to resume diuretics vs continuing dialysis.

## 2019-10-23 NOTE — HPI
SUBJECTIVE     History of Present Illness:   Buck Gann is a 54 y.o. male with a past psychiatry history of alcohol use disorder who presented to [facility] due to decompensated liver cirrhosis. Psychiatry was consulted to address the patient's LTE.    Per Primary MD:  54M with alcoholic cirrhosis presents as transfer from [facility] for management of decompensated cirrhosis and oliguria.  He was admitted there 10/16 with 3 weeks of progressively worsening dyspnea, abdominal distension, and fatigue.  He was admitted to the ICU in respiratory distress and put on BiPAP.  He also had DEON with his creatinine up to 3 from 1.6 and apparently wasn't responding sufficiently to diuresis so within a day or so a dialysis line was placed and dialysis was begun for volume removal, after which his respiratory status began to improve.  He was able to wean down to 2-3 L NC O2.  His urine output has been scant, however.  A dinh was placed which has had a small amount of muddy colored output.  He had a 2L paracentesis during this hospitalization as well.  He has been having some persistent back and abdominal pain while in the hospital for which he was receiving morphine.  He feels considerably better than he did a week ago.    He follows with Dr Smith as an outpatient and was referred for outpatient transplant workup a few months ago but due to financial concerns has so far not made it over here to begin the evaluation.  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  He admits to some occasional alcohol use over the past several months.      Per Addiction Psych MD:  Patient interviewed at bedside. Intermittently throughout interview, timelines were inconsistent. CAM-ICU negative so inconsistency possibly due to encephalopathy. He reports his alcohol usage began when he was a teenager, but  "it was casual and social use only until his wife passed away suddenly in 2009 from myocardial infarction. Reports alcohol intake increased to one 6-pack of beer, once a week from that time until 2013, when he began to develop abdominal and back pain secondary to alcoholic pancreatitis. He had numerous instances of alcohol pancreatitis and was referred to inpatient rehab ([facility]) and attended AA meetings for one year. Reports was able to maintain sobriety for one year with AA but stopped attending meetings due to boredom. His longest period of abstinence was 1 year. Reports last drink was 1 month ago, and states that he would drink casually while watching football games. Endorses that he knew he needed to not drink for his liver, but now that he is so sick, he will "never drink again." He prefers not to be discharged to rehab but is amenable to going to rehab and/or AA meetings if the transplant team requests it.     Substance Abuse History:  Substance of Choice: marijuana, one time only in the past  Substances Used: no history of illicit drug use  History of IVDU?: No  Use of Alcohol: 3 out of 4 on CAGE, started drinking around high-school age, worsened when wife passed away in 2009.   Average Consumption: 6-pack once a week for 3-4 years, denies regular heavy liquor consumption..  Last Drink: 1 month ago  Use of Medications for Alcohol/Opioid Use Disorder: No  History of Complicated Withdrawal(s): No  History of Detox: Yes -   Rehab History: Yes - several years ago  AA/NA involvement: Yes - stopped after 1 year  Tobacco: No  Spouse/Partner Consumption: No  Patient Aware of Biomedical Complications: Yes    DSM-5 Substance Use Disorder Criteria:  1. Often take in larger amounts or over a longer period of time than was intended: Yes  2. Persistent desire or unsuccessful efforts to cut down or control use: Yes  3. Great deal of time spent in activities necessary to obtain substance, use, or recover from effects:  " No  4. Craving/strong desire for substance or urge to use: Yes  5. Use resulting in failure to fulfill major role obligations at home, work or school: No  6. Social, occupational, recreational activities decreased because of use: No  7. Continued use despite having persistent or recurrent social or interpersonal problems cause or exaserbated by the substance: Yes  8. Recurrent use in situations in which it is physically hazardous: Yes  9. Use despite physical or psychological problems that are likely to have been caused or exacerbated by the substance: Yes  10. Tolerance, as defined by either of the following: No   A. A need for markedly increased amounts of substance to achieve intoxication or desired effect. -OR-    B. A markedly diminished effect with continued use of the same amount of substance.  11. Withdrawal, as manifested by the following: No   A. The characteristic withdrawal syndrome for substance. -AND-   B. Substance is taken to relieve or avoid withdrawal symptoms.  Mild (1-3), Moderate (4-5), Severe (?6)    Transplant Evaluation:  1st informed of impending organ failure - Yes - February 2019  When was the subject first of transplantation first broached - Yes - February 2019  Pt made the following lifestyle adjustments and how - No, has continued drinking once football season started up  View of AA - Helpful at the time, but got bored after 1 year  Has addiction affected your organ failure - Yes  Last use of substances -Yes, 1 month ago  Psychiatric diagnosis - denies but endorses depression after wife passed  Understand need for lifetime medication - Yes  Expectations - Expecting improvement  Social support - No, gets along well with son but doesn't believe he can help him afterwards; has two elderly parents; lives by self  Including pre & post-op - No    Psychiatric History:  Diagnose(s): No  Previous Medication Trials: No  Previous Psychiatric Hospitalizations: No  Family Psychiatric History:  No  Outpatient Psychiatrist: No    Suicide/Violence Risk Assessment:  Current/active suicidal ideation/plan/intent: No  Previous suicide attempts: No  Current/active homicidal ideation/plan/intent: No  History of threats/arrests associated with violent conduct - No  Access to firearms/lethal weapons - Yes - access to shotgun for hunting    Social History:  Marital Status: ,  3x in the past  Children: 1   Employment Status: on disability  Education: high school diploma/GED  Special Ed: no  Housing Status: Yes - trailer  Developmental History: No  History of Abuse: No    Legal History:  Past Charges/Incarcerations: No  Pending Charges: No    Psychosocial Factors:  Stressors: financial and health.   Functioning Relationships: relationship intact with son  Maladaptive or problem behaviors: No  Peer group, social, ethic, cultural, emotional, and health factors: No  Living situation, family constellation, family circumstances/home: No  Recovery environment: No  Community resources used by patient: Yes - AA in the past  Treatment acceptance/motivation for change: yes - states will go to rehab/meeting if requested    Collateral: denies    Psychiatric Review of Systems:  sleep: yes, hard time going to sleep  appetite: no  weight: yes, ascites  energy/anergy: no  interest/pleasure/anhedonia: no  somatic symptoms: yes, back pain  guilty/hopelessness: yes  concentration: no  S.I.B.s/risky behavior: no  SI/SA:  no    anxiety/panic: no  Agoraphobia:  no  Social phobia:  no  Recurrent nightmares:  no  hyper startle response:  no  Avoidance: no  Recurrent thoughts:  no  Recurrent behaviors:  no    Irritability: no  Racing thoughts: no  Impulsive behaviors: no  Pressured speech:  no    Paranoia:no  Delusions: no  AVH:no    Medical Review Of Systems:  Pertinent items noted in HPI

## 2019-10-23 NOTE — SUBJECTIVE & OBJECTIVE
Review of Systems   Constitutional: Positive for activity change and fatigue.   HENT: Negative for hearing loss and nosebleeds.    Eyes: Negative for photophobia and visual disturbance.   Respiratory: Positive for shortness of breath. Negative for cough.    Cardiovascular: Positive for leg swelling. Negative for chest pain.   Gastrointestinal: Positive for abdominal distention and abdominal pain. Negative for nausea and vomiting.   Endocrine: Negative for polydipsia and polyuria.   Genitourinary: Positive for decreased urine volume. Negative for dysuria.   Musculoskeletal: Negative for arthralgias and joint swelling.   Skin: Positive for color change. Negative for rash.   Neurological: Negative for dizziness and syncope.   Hematological: Bruises/bleeds easily.       Past Medical History:   Diagnosis Date    Alcohol abuse     Cirrhosis     Pancreatitis        Past Surgical History:   Procedure Laterality Date    GASTRIC BYPASS      [facility]urora-en-y       Family history of liver disease: No    Review of patient's allergies indicates:  No Known Allergies    Tobacco Use    Smoking status: Never Smoker    Smokeless tobacco: Never Used   Substance and Sexual Activity    Alcohol use: Yes     Comment: last drink, about 1 month ago per pt    Drug use: Yes     Types: Marijuana     Comment: last used about 1-2 months ago    Sexual activity: Not Currently       Medications Prior to Admission   Medication Sig Dispense Refill Last Dose    furosemide (LASIX) 40 MG tablet Take 1 tablet (40 mg total) by mouth once daily. 30 tablet 5 Taking    pantoprazole (PROTONIX) 20 MG tablet Take 20 mg by mouth once daily.   Taking    potassium chloride SA (K-DUR,KLOR-CON) 20 MEQ tablet TAKE 1 TABLET(20 MEQ) BY MOUTH EVERY DAY 30 tablet 0     spironolactone (ALDACTONE) 100 MG tablet Take 1 tablet (100 mg total) by mouth once daily. 30 tablet 5 Taking    traMADol (ULTRAM) 50 mg tablet Take 1 tablet (50 mg total) by mouth every 8 (eight)  hours as needed for Pain. Medically necessary for greater than 7 days. 30 tablet 0     VIRT-CAPS 1 mg Cap Take 1 mg by mouth once daily.   Taking       Objective:     Vital Signs (Most Recent):  Temp: 97.8 °F (36.6 °C) (10/23/19 0745)  Pulse: 89 (10/23/19 0745)  Resp: 20 (10/23/19 0745)  BP: (!) 116/56 (10/23/19 0745)  SpO2: 95 % (10/23/19 0745) Vital Signs (24h Range):  Temp:  [97.7 °F (36.5 °C)-98 °F (36.7 °C)] 97.8 °F (36.6 °C)  Pulse:  [89-95] 89  Resp:  [20-22] 20  SpO2:  [94 %-97 %] 95 %  BP: ()/(55-60) 116/56     Weight: 108.4 kg (239 lb) (10/23/19 0745)  Body mass index is 35.29 kg/m².    Physical Exam   Constitutional: He is oriented to person, place, and time. He appears well-developed. He is cooperative. He has a sickly appearance. Nasal cannula in place.   obese   HENT:   Head: Normocephalic and atraumatic.   Mouth/Throat: Oropharynx is clear and moist.   Eyes: Pupils are equal, round, and reactive to light. EOM are normal. Scleral icterus is present.   Neck: Normal range of motion. Neck supple.   Cardiovascular: Regular rhythm and normal heart sounds.   Pulmonary/Chest: Effort normal. He has rales in the right lower field and the left lower field.   Decreased breath sounds in right lung field   Abdominal: Soft. Normal appearance and bowel sounds are normal. There is generalized tenderness. There is no rigidity, no rebound and no guarding.   Musculoskeletal: Normal range of motion. He exhibits edema. He exhibits no tenderness.   Lymphadenopathy:     He has no cervical adenopathy.   Neurological: He is alert and oriented to person, place, and time. No cranial nerve deficit.   Skin: Skin is warm and dry.   Nursing note and vitals reviewed.      MELD-Na score: 37 at 10/23/2019  5:06 AM  MELD score: 37 at 10/23/2019  5:06 AM  Calculated from:  Serum Creatinine: 2.8 mg/dL at 10/23/2019  5:06 AM  Serum Sodium: 136 mmol/L at 10/23/2019  5:06 AM  Total Bilirubin: 24.0 mg/dL at 10/23/2019  5:06  AM  INR(ratio): 2.2 at 10/23/2019  5:06 AM  Age: 54 years    Significant Labs:  Labs within the past month have been reviewed.    Significant Imaging:  Labs: Reviewed

## 2019-10-23 NOTE — ASSESSMENT & PLAN NOTE
ASSESSMENT     Buck Gann is a 54 y.o. male with a past psychiatry history of alcohol use disorder who [place] [facility] [place] to decompensated liver cirrhosis. Psychiatry was consulted to address the patient's history of LTE.    Patient was vague and appeared to be minimizing history and amount of alcohol use. Reports that he was told in February 2019 that he needed a new liver yet continued casually drinking. Reports last drink was a month ago. Patient also states at his heaviest intake was only drinking one six pack of beer per week for four years. Reports history of previous inpatient rehab and sobriety for one year. Pending PETH testing. Pt is high risk.    IMPRESSION  Alcohol use disorder - severe    RECOMMENDATION(S)     1. Scheduled Medication(s):  Per primary team    2. PRN Medication(s):  Per primary team    3. Other:  · HIGH risk at this time  · Recommend serial PETH testing  · Recommend serial urine toxicology screening  · Recommend referral to and completion of day program for education on alcohol abuse, for coping/behavioral skills to reinforce sobriety, as well as to attend AA meetings.    In cases of emergency, daily coverage provided by Acute/ER Psych MD/NP/GURJIT, with contact numbers located in [facility] On Call Schedule    Case discussed with staff addiction psychiatrist: Ramiro Brian MD

## 2019-10-23 NOTE — PLAN OF CARE
Connie Sahni MD   [address] [facility] [facility] [place] [address]       [id number] - [place], [place] - [address] AT [facility] [address]  [facility] [address]  Phone: [phone] Fax: [phone]    Payor: BLUE CROSS BLUE SHIELD / Plan: BLUE CONNECT / Product Type: HMO /           10/23/19 1428   Discharge Assessment   Assessment Type Discharge Planning Assessment   Assessment information obtained from? Medical Record   Expected Length of Stay (days) 5   Prior to hospitilization cognitive status: Alert/Oriented   Prior to hospitalization functional status: Assistive Equipment   Current cognitive status: Unable to Assess   Current Functional Status:   (Unable to assess)   Facility Arrived From: Transfer from [facility] With parent(s)   Is patient able to care for self after discharge? Unable to determine at this time (comments)   Who are your caregiver(s) and their phone number(s)? Edu Gann (father) [phone]   Readmission Within the Last 30 Days no previous admission in last 30 days   Patient currently being followed by outpatient case management? No   Patient currently receives any other outside agency services? No   Equipment Currently Used at Home cane, straight   Does the patient receive services at the Coumadin Clinic? No   Discharge Plan A Home Health   Discharge Plan B Inpatient Hospice   DME Needed Upon Discharge  other (see comments)  (TBD)   Patient/Family in Agreement with Plan unable to assess

## 2019-10-23 NOTE — PROGRESS NOTES
Pre-Liver SW consult:    SW presented to patient's bedside and patient was observed sitting upright in bed and alert. Patient was observed in his room alone and reported transferring to [facility] the day before. Pt reported that a caregiver will most likely present on another day, as most of his family works. SW spoke to patient about a possible caregiver plan. Patient reported that he has a brother (Kris [phone]) that works two to three days out of the week that may be a potential back-up caregiver. Pt's son, Andrey (26yo) ph#[phone], works and pt does not want to burden him, as he realizes heavily on his income. Patient also has a sister, Telma ph#[phone]). Telma reportedly has two children with autism and works. SW was able to speak to the sister over speaker phone while at bedside. She confirmed that she would not be able to act as a primary caregiver, but feels that their brother Kris and his wife may be able to assist.     SW contacted pt's son, and was required to leave a message requesting callback. SW also contacted pt's brother, Kris. Kris reported that his spouse does not work and could as as a primary caregiver and he could assist as backup. Due to the patient having some confusion, his brother was able to provide some history on pt's past substance use. Per brother, the patient has an extensive history of ETOH use that started as a teenager. Reportedly patient was a daily drinker, drinking a galloon or more a day. Kris mentioned that pt's ETOH use has increased more within the past few years. Pt went to [facility] for about a week and relapsed shortly after. He reportedly also attempted AA for a short period of time. Pt's brother stated that the pt also used cocaine on and off over the years. Though the pt told the SW that he last had ETOH a month or two ago, his brother stated that he believes it was more like a week to 10 days ago. Caregiver reported that he feels that his brother  is ready to quit and change his life around due to his medical decline. GURJIT remains available and will provide transplant team with update regarding consult.

## 2019-10-23 NOTE — SUBJECTIVE & OBJECTIVE
Past Medical History:   Diagnosis Date    Alcohol abuse     Cirrhosis     Pancreatitis        Past Surgical History:   Procedure Laterality Date    GASTRIC BYPASS      [facility]-en-y       Review of patient's allergies indicates:  No Known Allergies  Current Facility-Administered Medications   Medication Frequency    dextrose 10% (D10W) Bolus PRN    dextrose 10% (D10W) Bolus PRN    glucagon (human recombinant) injection 1 mg PRN    glucose chewable tablet 16 g PRN    glucose chewable tablet 24 g PRN    lidocaine 5 % patch 1 patch Q24H    midodrine tablet 10 mg TID    pantoprazole EC tablet 20 mg Daily    phytonadione vitamin k (AQUA-MEPHYTON) 10 mg in dextrose 5 % 50 mL IVPB Daily    polyethylene glycol packet 17 g TID PRN    sodium chloride 0.9% flush 10 mL PRN    traMADol tablet 50 mg Q6H PRN     Family History     None        Tobacco Use    Smoking status: Never Smoker    Smokeless tobacco: Never Used   Substance and Sexual Activity    Alcohol use: Yes     Comment: last drink, about 1 month ago per pt    Drug use: Yes     Types: Marijuana     Comment: last used about 1-2 months ago    Sexual activity: Not Currently     Review of Systems   Constitutional: Negative for chills and fever.   HENT: Negative for sore throat.    Respiratory: Negative for cough and shortness of breath.    Cardiovascular: Negative for chest pain.   Gastrointestinal: Positive for abdominal distention and abdominal pain. Negative for diarrhea, nausea and vomiting.   Genitourinary: Negative for dysuria and hematuria.   Neurological: Negative for dizziness, seizures and numbness.   Psychiatric/Behavioral: Negative for agitation and confusion.     Objective:     Vital Signs (Most Recent):  Temp: 97.8 °F (36.6 °C) (10/23/19 0745)  Pulse: 89 (10/23/19 0745)  Resp: 20 (10/23/19 0745)  BP: (!) 116/56 (10/23/19 0745)  SpO2: 95 % (10/23/19 0745)  O2 Device (Oxygen Therapy): nasal cannula (10/23/19 0745) Vital Signs (24h Range):  Temp:   [97.7 °F (36.5 °C)-98 °F (36.7 °C)] 97.8 °F (36.6 °C)  Pulse:  [89-95] 89  Resp:  [20-22] 20  SpO2:  [94 %-97 %] 95 %  BP: ()/(55-60) 116/56     Weight: 108.6 kg (239 lb 6.7 oz) (10/23/19 0150)  Body mass index is 35.36 kg/m².  Body surface area is 2.3 meters squared.    I/O last 3 completed shifts:  In: 60 [P.O.:60]  Out: 20 [Urine:20]    Physical Exam   Constitutional: He is oriented to person, place, and time. He appears well-developed. He is cooperative. He has a sickly appearance. Nasal cannula in place.   obese   HENT:   Head: Normocephalic and atraumatic.   Mouth/Throat: Oropharynx is clear and moist.   Eyes: Pupils are equal, round, and reactive to light. EOM are normal. Scleral icterus is present.   Neck: Normal range of motion. Neck supple.   Cardiovascular: Regular rhythm and normal heart sounds.   Pulmonary/Chest: Effort normal. He has rales in the right lower field and the left lower field.   Decreased breath sounds in right lung field   Abdominal: Soft. Normal appearance and bowel sounds are normal. There is generalized tenderness. There is no rigidity, no rebound and no guarding.   Musculoskeletal: Normal range of motion. He exhibits edema. He exhibits no tenderness.   Lymphadenopathy:     He has no cervical adenopathy.   Neurological: He is alert and oriented to person, place, and time. No cranial nerve deficit.   Skin: Skin is warm and dry.   Nursing note and vitals reviewed.      Significant Labs:  CBC:   Recent Labs   Lab 10/23/19  0506   WBC 10.92   RBC 2.58*   HGB 9.0*   HCT 27.9*   PLT 33*   *   MCH 34.9*   MCHC 32.3     CMP:   Recent Labs   Lab 10/23/19  0506   GLU 84   CALCIUM 8.5*   ALBUMIN 2.7*   PROT 5.5*      K 4.3   CO2 21*      BUN 21*   CREATININE 2.8*   ALKPHOS 132   ALT 14   AST 53*   BILITOT 24.0*     All labs within the past 24 hours have been reviewed.    Significant Imaging:  CXR personally reviewed.

## 2019-10-23 NOTE — CONSULTS
[facility]  Nephrology  Consult Note    Patient Name: Buck Gann  MRN: [id number]  Admission Date: 10/23/2019  Hospital Length of Stay: 0 days  Attending Provider: Steve Vang MD   Primary Care Physician: Connie Sahni MD  Principal Problem:<principal problem not specified>    Inpatient consult to Nephrology  Consult performed by: Ernesto Stafford MD  Consult ordered by: Stew Damon MD        Subjective:     HPI: 55 y/o White man with alcoholic cirrhosis, CKD stage 3a (baseline sCr ~1.6), admitted on 10/23/2019 after presented as transfer from [facility] for management of decompensated cirrhosis and oliguria.   [facility] patient admitted there 10/16 after he had developed 3 weeks of progressively worsening dyspnea, abdominal distension, and fatigue.   At OSH required ICU care, BiPAP, and RRt as patient's sCr had gone up to 3 from 1.6, oliguric, difficulty diuresing, worsening respiratory status.  After aggressive UF, was able to be weaned down oxygen req to 2-3L NC.  Transferred to our institution for liver txp workup.      Nephrology consulted for evaluation of Servando requiring RRT at outside hospital.      Past Medical History:   Diagnosis Date    Alcohol abuse     Cirrhosis     Pancreatitis        Past Surgical History:   Procedure Laterality Date    GASTRIC BYPASS      [facility]-en-y       Review of patient's allergies indicates:  No Known Allergies  Current Facility-Administered Medications   Medication Frequency    dextrose 10% (D10W) Bolus PRN    dextrose 10% (D10W) Bolus PRN    glucagon (human recombinant) injection 1 mg PRN    glucose chewable tablet 16 g PRN    glucose chewable tablet 24 g PRN    lidocaine 5 % patch 1 patch Q24H    midodrine tablet 10 mg TID    pantoprazole EC tablet 20 mg Daily    phytonadione vitamin k (AQUA-MEPHYTON) 10 mg in dextrose 5 % 50 mL IVPB Daily    polyethylene glycol packet 17 g TID PRN    sodium chloride  0.9% flush 10 mL PRN    traMADol tablet 50 mg Q6H PRN     Family History     None        Tobacco Use    Smoking status: Never Smoker    Smokeless tobacco: Never Used   Substance and Sexual Activity    Alcohol use: Yes     Comment: last drink, about 1 month ago per pt    Drug use: Yes     Types: Marijuana     Comment: last used about 1-2 months ago    Sexual activity: Not Currently     Review of Systems   Constitutional: Negative for chills and fever.   HENT: Negative for sore throat.    Respiratory: Negative for cough and shortness of breath.    Cardiovascular: Negative for chest pain.   Gastrointestinal: Positive for abdominal distention and abdominal pain. Negative for diarrhea, nausea and vomiting.   Genitourinary: Negative for dysuria and hematuria.   Neurological: Negative for dizziness, seizures and numbness.   Psychiatric/Behavioral: Negative for agitation and confusion.     Objective:     Vital Signs (Most Recent):  Temp: 97.8 °F (36.6 °C) (10/23/19 0745)  Pulse: 89 (10/23/19 0745)  Resp: 20 (10/23/19 0745)  BP: (!) 116/56 (10/23/19 0745)  SpO2: 95 % (10/23/19 0745)  O2 Device (Oxygen Therapy): nasal cannula (10/23/19 0745) Vital Signs (24h Range):  Temp:  [97.7 °F (36.5 °C)-98 °F (36.7 °C)] 97.8 °F (36.6 °C)  Pulse:  [89-95] 89  Resp:  [20-22] 20  SpO2:  [94 %-97 %] 95 %  BP: ()/(55-60) 116/56     Weight: 108.6 kg (239 lb 6.7 oz) (10/23/19 0150)  Body mass index is 35.36 kg/m².  Body surface area is 2.3 meters squared.    I/O last 3 completed shifts:  In: 60 [P.O.:60]  Out: 20 [Urine:20]    Physical Exam   Constitutional: He is oriented to person, place, and time. He appears well-developed. He is cooperative. He has a sickly appearance. Nasal cannula in place.   obese   HENT:   Head: Normocephalic and atraumatic.   Mouth/Throat: Oropharynx is clear and moist.   Eyes: Pupils are equal, round, and reactive to light. EOM are normal. Scleral icterus is present.   Neck: Normal range of motion. Neck  supple.   Cardiovascular: Regular rhythm and normal heart sounds.   Pulmonary/Chest: Effort normal. He has rales in the right lower field and the left lower field.   Decreased breath sounds in right lung field   Abdominal: Soft. Normal appearance and bowel sounds are normal. There is generalized tenderness. There is no rigidity, no rebound and no guarding.   Musculoskeletal: Normal range of motion. He exhibits edema. He exhibits no tenderness.   Lymphadenopathy:     He has no cervical adenopathy.   Neurological: He is alert and oriented to person, place, and time. No cranial nerve deficit.   Skin: Skin is warm and dry.   Nursing note and vitals reviewed.      Significant Labs:  CBC:   Recent Labs   Lab 10/23/19  0506   WBC 10.92   RBC 2.58*   HGB 9.0*   HCT 27.9*   PLT 33*   *   MCH 34.9*   MCHC 32.3     CMP:   Recent Labs   Lab 10/23/19  0506   GLU 84   CALCIUM 8.5*   ALBUMIN 2.7*   PROT 5.5*      K 4.3   CO2 21*      BUN 21*   CREATININE 2.8*   ALKPHOS 132   ALT 14   AST 53*   BILITOT 24.0*     All labs within the past 24 hours have been reviewed.    Significant Imaging:  CXR personally reviewed.    Assessment/Plan:     SERVANDO (acute kidney injury)  Patient with Servando over CKD3 (baseline sCr 1.6-1.8) uncertain at this time, as patient was transferred from previous hospital where he received RRt at previous hospital, maybe HRS or iATN in setting of aggressive diurese and prolonged prerenal state with decreased effective circulatory volume.    Plan:  - Renal US  - Urine microscopy by MD (when able to produce urine)  - U/A, UPCr  - Urine Sodium  - For now no RRT at this time but will reassess daily  - Avoid nephrotoxic meds, NSAIDs, IV contrast, etc  - MAP > 65  - Will follow closely    Alcoholic cirrhosis of liver with ascites  Per primary team    Acute hypoxemic respiratory failure  With CXR showing Rt white lung; recommend pulmonology eval for likely thoracentesis  Per primary        Thank you for  your consult. I will follow-up with patient. Please contact us if you have any additional questions.    Ernesto Stafford MD  Nephrology  [facility]    ATTENDING PHYSICIAN ATTESTATION  I have personally verified the history and examined the patient. I thoroughly reviewed the demographic, clinical, laboratorial and imaging information available in medical records. I agree with the assessment and recommendations provided by the subspecialty resident who was under my supervision.

## 2019-10-23 NOTE — PLAN OF CARE
10/23/19 1433   Post-Acute Status   Post-Acute Authorization Home Health/Hospice   Home Health/Hospice Status Awaiting Internal Medical Clearance   Discharge Delays None known at this time

## 2019-10-23 NOTE — PLAN OF CARE
POC reviewed with patient. Address questions and concerns. AAOX3, forgetful. VVS. Skin integrity repositioned q2h with wedge and apply barrier cream and some weeping from upper extremity. Heel protector removed after being on most of shift per patient request. Tolerate meal well. No n/v. Safety maintained. Free from falls. Call light in reach. Bed alarm activated. WCTM

## 2019-10-23 NOTE — ASSESSMENT & PLAN NOTE
Patient with Servando over CKD3 (baseline sCr 1.6-1.8) uncertain at this time, as patient was transferred from previous hospital where he received RRt at previous hospital, maybe HRS or iATN in setting of aggressive diurese and prolonged prerenal state with decreased effective circulatory volume.    Plan:  - Renal US  - Urine microscopy by MD (when able to produce urine)  - U/A, UPCr  - Urine Sodium  - For now no RRT at this time but will reassess daily  - Avoid nephrotoxic meds, NSAIDs, IV contrast, etc  - MAP > 65  - Will follow closely

## 2019-10-23 NOTE — CONSULTS
[facility]  Hepatology  Consult Note    Patient Name: Buck Gann  MRN: [id number]  Admission Date: 10/23/2019  Hospital Length of Stay: 0 days  Attending Provider: Steve Vang MD   Primary Care Physician: Connie Sahni MD  Principal Problem:<principal problem not specified>    Inpatient consult to Hepatology  Consult performed by: Chaparro Ireland MD  Consult ordered by: Stew Damon MD        Subjective:     Transplant status: No    HPI:  Mr. Gann is a 54 M with alcoholic cirrhosis presents as transfer from [facility] for management of decompensated cirrhosis and oliguria. Hepatology is being consulted for the elevaation of liver transplant.     In brief the patient was admitted to a OSH on 10/16 with 3 weeks of progressively worsening dyspnea, abdominal distension, and fatigue.  He was admitted to the ICU in respiratory distress and put on BiPAP.  He also had DEON with his creatinine up to 3 from 1.6 and apparently wasn't responding sufficiently to diuresis so within a day or so a dialysis line was placed and dialysis was begun for volume removal, after which his respiratory status began to improve.  He was able to wean down to 2-3 L NC O2.  His urine output has been scant, however.  A dinh was placed which has had a small amount of muddy colored output.  He had a 2L paracentesis during this hospitalization as well.  He has been having some persistent back and abdominal pain while in the hospital for which he was receiving morphine.  He feels considerably better than he did a week ago.      He follows with Dr Smith as an outpatient and was referred for outpatient transplant workup a few months ago but due to financial concerns has so far not made it over here to begin the evaluation.  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  He admits to some occasional alcohol use over the past several months.          Review of Systems   Constitutional: Positive for activity change and fatigue.   HENT: Negative for hearing loss and nosebleeds.    Eyes: Negative for photophobia and visual disturbance.   Respiratory: Positive for shortness of breath. Negative for cough.    Cardiovascular: Positive for leg swelling. Negative for chest pain.   Gastrointestinal: Positive for abdominal distention and abdominal pain. Negative for nausea and vomiting.   Endocrine: Negative for polydipsia and polyuria.   Genitourinary: Positive for decreased urine volume. Negative for dysuria.   Musculoskeletal: Negative for arthralgias and joint swelling.   Skin: Positive for color change. Negative for rash.   Neurological: Negative for dizziness and syncope.   Hematological: Bruises/bleeds easily.       Past Medical History:   Diagnosis Date    Alcohol abuse     Cirrhosis     Pancreatitis        Past Surgical History:   Procedure Laterality Date    GASTRIC BYPASS      [facility]urora-en-y       Family history of liver disease: No    Review of patient's allergies indicates:  No Known Allergies    Tobacco Use    Smoking status: Never Smoker    Smokeless tobacco: Never Used   Substance and Sexual Activity    Alcohol use: Yes     Comment: last drink, about 1 month ago per pt    Drug use: Yes     Types: Marijuana     Comment: last used about 1-2 months ago    Sexual activity: Not Currently       Medications Prior to Admission   Medication Sig Dispense Refill Last Dose    furosemide (LASIX) 40 MG tablet Take 1 tablet (40 mg total) by mouth once daily. 30 tablet 5 Taking    pantoprazole (PROTONIX) 20 MG tablet Take 20 mg by mouth once daily.   Taking    potassium chloride SA (K-DUR,KLOR-CON) 20 MEQ tablet TAKE 1 TABLET(20 MEQ) BY MOUTH EVERY DAY 30 tablet 0     spironolactone (ALDACTONE) 100 MG tablet Take 1 tablet (100 mg total) by mouth once daily. 30 tablet 5 Taking     traMADol (ULTRAM) 50 mg tablet Take 1 tablet (50 mg total) by mouth every 8 (eight) hours as needed for Pain. Medically necessary for greater than 7 days. 30 tablet 0     VIRT-CAPS 1 mg Cap Take 1 mg by mouth once daily.   Taking       Objective:     Vital Signs (Most Recent):  Temp: 97.8 °F (36.6 °C) (10/23/19 0745)  Pulse: 89 (10/23/19 0745)  Resp: 20 (10/23/19 0745)  BP: (!) 116/56 (10/23/19 0745)  SpO2: 95 % (10/23/19 0745) Vital Signs (24h Range):  Temp:  [97.7 °F (36.5 °C)-98 °F (36.7 °C)] 97.8 °F (36.6 °C)  Pulse:  [89-95] 89  Resp:  [20-22] 20  SpO2:  [94 %-97 %] 95 %  BP: ()/(55-60) 116/56     Weight: 108.4 kg (239 lb) (10/23/19 0745)  Body mass index is 35.29 kg/m².    Physical Exam   Constitutional: He is oriented to person, place, and time. He appears well-developed. He is cooperative. He has a sickly appearance. Nasal cannula in place.   obese   HENT:   Head: Normocephalic and atraumatic.   Mouth/Throat: Oropharynx is clear and moist.   Eyes: Pupils are equal, round, and reactive to light. EOM are normal. Scleral icterus is present.   Neck: Normal range of motion. Neck supple.   Cardiovascular: Regular rhythm and normal heart sounds.   Pulmonary/Chest: Effort normal. He has rales in the right lower field and the left lower field.   Decreased breath sounds in right lung field   Abdominal: Soft. Normal appearance and bowel sounds are normal. There is generalized tenderness. There is no rigidity, no rebound and no guarding.   Musculoskeletal: Normal range of motion. He exhibits edema. He exhibits no tenderness.   Lymphadenopathy:     He has no cervical adenopathy.   Neurological: He is alert and oriented to person, place, and time. No cranial nerve deficit.   Skin: Skin is warm and dry.   Nursing note and vitals reviewed.      MELD-Na score: 37 at 10/23/2019  5:06 AM  MELD score: 37 at 10/23/2019  5:06 AM  Calculated from:  Serum Creatinine: 2.8 mg/dL at 10/23/2019  5:06 AM  Serum Sodium: 136 mmol/L  at 10/23/2019  5:06 AM  Total Bilirubin: 24.0 mg/dL at 10/23/2019  5:06 AM  INR(ratio): 2.2 at 10/23/2019  5:06 AM  Age: 54 years    Significant Labs:  Labs within the past month have been reviewed.    Significant Imaging:  Labs: Reviewed    Assessment/Plan:     Alcoholic cirrhosis of liver with ascites  54 y.o. male with a past psychiatry history of alcohol use disorder who presented to [facility] due to decompensated liver cirrhosis.Hepatology consulted for decompensated liver failure and transplant evaluation.    PLAN  - PETH test (pending)  - neprology consult; continue dialysis?  - addiction psych consult  - patient is high risk for liver transplant    -- reports last drink 1 month ago, will most likely need addiction rehab program prior to liver transplant evaluation    -- poor social support   -- will have addiction psych and  talk to patient   - consider pulmonary consult   -- patient with requriing 3L NC, R-pleural effusion, possible thoracentesis        Thank you for your consult. I will follow-up with patient. Please contact us if you have any additional questions.    Chaparro Ireland MD  Hepatology  [facility]

## 2019-10-23 NOTE — HPI
Mr. Gann is a 54 M with alcoholic cirrhosis presents as transfer from [facility] for management of decompensated cirrhosis and oliguria. Hepatology is being consulted for the elevaation of liver transplant.     In brief the patient was admitted to a OSH on 10/16 with 3 weeks of progressively worsening dyspnea, abdominal distension, and fatigue.  He was admitted to the ICU in respiratory distress and put on BiPAP.  He also had DEON with his creatinine up to 3 from 1.6 and apparently wasn't responding sufficiently to diuresis so within a day or so a dialysis line was placed and dialysis was begun for volume removal, after which his respiratory status began to improve.  He was able to wean down to 2-3 L NC O2.  His urine output has been scant, however.  A dinh was placed which has had a small amount of muddy colored output.  He had a 2L paracentesis during this hospitalization as well.  He has been having some persistent back and abdominal pain while in the hospital for which he was receiving morphine.  He feels considerably better than he did a week ago.      He follows with Dr Smith as an outpatient and was referred for outpatient transplant workup a few months ago but due to financial concerns has so far not made it over here to begin the evaluation.  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  He admits to some occasional alcohol use over the past several months.

## 2019-10-23 NOTE — HPI
54M with alcoholic cirrhosis presents as transfer from [facility] for management of decompensated cirrhosis and oliguria.  He was admitted there 10/16 with 3 weeks of progressively worsening dyspnea, abdominal distension, and fatigue.  He was admitted to the ICU in respiratory distress and put on BiPAP.  He also had DEON with his creatinine up to 3 from 1.6 and apparently wasn't responding sufficiently to diuresis so within a day or so a dialysis line was placed and dialysis was begun for volume removal, after which his respiratory status began to improve.  He was able to wean down to 2-3 L NC O2.  His urine output has been scant, however.  A dinh was placed which has had a small amount of muddy colored output.  He had a 2L paracentesis during this hospitalization as well.  He has been having some persistent back and abdominal pain while in the hospital for which he was receiving morphine.  He feels considerably better than he did a week ago.    He follows with Dr Smith as an outpatient and was referred for outpatient transplant workup a few months ago but due to financial concerns has so far not made it over here to begin the evaluation.  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  He admits to some occasional alcohol use over the past several months.

## 2019-10-23 NOTE — CONSULTS
[facility]  Psychiatry  Consult Note    Patient Name: Buck Gann  MRN: [id number]   Code Status: Full Code  Admission Date: 10/23/2019  Hospital Length of Stay: 0 days  Attending Physician: Steve Vang MD  Primary Care Provider: Connie Sahni MD    Current Legal Status: N/A    Patient information was obtained from patient and ER records.   Inpatient consult to Psychiatry  Consult performed by: Cherry Kearney DO  Consult ordered by: Steve Vang MD        Subjective:     Principal Problem:Alcoholic cirrhosis of liver with ascites    Chief Complaint:  LTE     HPI:   SUBJECTIVE     History of Present Illness:   Buck Gann is a 54 y.o. male with a past psychiatry history of alcohol use disorder who presented to [facility] due to decompensated liver cirrhosis. Psychiatry was consulted to address the patient's LTE.    Per Primary MD:  54M with alcoholic cirrhosis presents as transfer from [facility] for management of decompensated cirrhosis and oliguria.  He was admitted there 10/16 with 3 weeks of progressively worsening dyspnea, abdominal distension, and fatigue.  He was admitted to the ICU in respiratory distress and put on BiPAP.  He also had DEON with his creatinine up to 3 from 1.6 and apparently wasn't responding sufficiently to diuresis so within a day or so a dialysis line was placed and dialysis was begun for volume removal, after which his respiratory status began to improve.  He was able to wean down to 2-3 L NC O2.  His urine output has been scant, however.  A dinh was placed which has had a small amount of muddy colored output.  He had a 2L paracentesis during this hospitalization as well.  He has been having some persistent back and abdominal pain while in the hospital for which he was receiving morphine.  He feels considerably better than he did a week ago.    He follows with Dr Smith as an outpatient and was referred for outpatient transplant workup a few months ago  "but due to financial concerns has so far not made it over here to begin the evaluation.  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  He admits to some occasional alcohol use over the past several months.      Per Addiction Psych MD:  Patient interviewed at bedside. Intermittently throughout interview, timelines were inconsistent. CAM-ICU negative so inconsistency possibly due to encephalopathy. He reports his alcohol usage began when he was a teenager, but it was casual and social use only until his wife passed away suddenly in 2009 from myocardial infarction. Reports alcohol intake increased to one 6-pack of beer, once a week from that time until 2013, when he began to develop abdominal and back pain secondary to alcoholic pancreatitis. He had numerous instances of alcohol pancreatitis and was referred to inpatient rehab ([facility]) and attended AA meetings for one year. Reports was able to maintain sobriety for one year with AA but stopped attending meetings due to boredom. His longest period of abstinence was 1 year. Reports last drink was 1 month ago, and states that he would drink casually while watching football games. Endorses that he knew he needed to not drink for his liver, but now that he is so sick, he will "never drink again." He prefers not to be discharged to rehab but is amenable to going to rehab and/or AA meetings if the transplant team requests it.     Substance Abuse History:  Substance of Choice: marijuana, one time only in the past  Substances Used: no history of illicit drug use  History of IVDU?: No  Use of Alcohol: 3 out of 4 on CAGE, started drinking around high-school age, worsened when wife passed away in 2009.   Average Consumption: 6-pack once a week for 3-4 years, denies regular heavy liquor consumption..  Last Drink: 1 month ago  Use of Medications for " Alcohol/Opioid Use Disorder: No  History of Complicated Withdrawal(s): No  History of Detox: Yes -   Rehab History: Yes - several years ago  AA/NA involvement: Yes - stopped after 1 year  Tobacco: No  Spouse/Partner Consumption: No  Patient Aware of Biomedical Complications: Yes    DSM-5 Substance Use Disorder Criteria:  1. Often take in larger amounts or over a longer period of time than was intended: Yes  2. Persistent desire or unsuccessful efforts to cut down or control use: Yes  3. Great deal of time spent in activities necessary to obtain substance, use, or recover from effects:  No  4. Craving/strong desire for substance or urge to use: Yes  5. Use resulting in failure to fulfill major role obligations at home, work or school: No  6. Social, occupational, recreational activities decreased because of use: No  7. Continued use despite having persistent or recurrent social or interpersonal problems cause or exaserbated by the substance: Yes  8. Recurrent use in situations in which it is physically hazardous: Yes  9. Use despite physical or psychological problems that are likely to have been caused or exacerbated by the substance: Yes  10. Tolerance, as defined by either of the following: No   A. A need for markedly increased amounts of substance to achieve intoxication or desired effect. -OR-    B. A markedly diminished effect with continued use of the same amount of substance.  11. Withdrawal, as manifested by the following: No   A. The characteristic withdrawal syndrome for substance. -AND-   B. Substance is taken to relieve or avoid withdrawal symptoms.  Mild (1-3), Moderate (4-5), Severe (?6)    Transplant Evaluation:  1st informed of impending organ failure - Yes - February 2019  When was the subject first of transplantation first broached - Yes - February 2019  Pt made the following lifestyle adjustments and how - No, has continued drinking once football season started up  View of AA - Helpful at the time,  but got bored after 1 year  Has addiction affected your organ failure - Yes  Last use of substances -Yes, 1 month ago  Psychiatric diagnosis - denies but endorses depression after wife passed  Understand need for lifetime medication - Yes  Expectations - Expecting improvement  Social support - No, gets along well with son but doesn't believe he can help him afterwards; has two elderly parents; lives by self  Including pre & post-op - No    Psychiatric History:  Diagnose(s): No  Previous Medication Trials: No  Previous Psychiatric Hospitalizations: No  Family Psychiatric History: No  Outpatient Psychiatrist: No    Suicide/Violence Risk Assessment:  Current/active suicidal ideation/plan/intent: No  Previous suicide attempts: No  Current/active homicidal ideation/plan/intent: No  History of threats/arrests associated with violent conduct - No  Access to firearms/lethal weapons - Yes - access to shotgun for hunting    Social History:  Marital Status: ,  3x in the past  Children: 1   Employment Status: on disability  Education: high school diploma/GED  Special Ed: no  Housing Status: Yes - trailer  Developmental History: No  History of Abuse: No    Legal History:  Past Charges/Incarcerations: No  Pending Charges: No    Psychosocial Factors:  Stressors: financial and health.   Functioning Relationships: relationship intact with son  Maladaptive or problem behaviors: No  Peer group, social, ethic, cultural, emotional, and health factors: No  Living situation, family constellation, family circumstances/home: No  Recovery environment: No  Community resources used by patient: Yes - AA in the past  Treatment acceptance/motivation for change: yes - states will go to rehab/meeting if requested    Collateral: denies    Psychiatric Review of Systems:  sleep: yes, hard time going to sleep  appetite: no  weight: yes, ascites  energy/anergy: no  interest/pleasure/anhedonia: no  somatic symptoms: yes, back  pain  guilty/hopelessness: yes  concentration: no  S.I.B.s/risky behavior: no  SI/SA:  no    anxiety/panic: no  Agoraphobia:  no  Social phobia:  no  Recurrent nightmares:  no  hyper startle response:  no  Avoidance: no  Recurrent thoughts:  no  Recurrent behaviors:  no    Irritability: no  Racing thoughts: no  Impulsive behaviors: no  Pressured speech:  no    Paranoia:no  Delusions: no  AVH:no    Medical Review Of Systems:  Pertinent items noted in HPI      Hospital Course: No notes on file         Patient History           Medical as of 10/23/2019     Past Medical History     Diagnosis Date Comments Source    Alcohol abuse -- -- Provider    Cirrhosis -- -- Provider    Pancreatitis -- -- Provider                  Surgical as of 10/23/2019     Past Surgical History     Procedure Laterality Date Comments Source    GASTRIC BYPASS -- -- [facility] Provider                  Family as of 10/23/2019    None           Tobacco Use as of 10/23/2019     Smoking Status Smoking Start Date Smoking Quit Date Packs/Day Years Used    Never Smoker -- -- -- --    Types Comments Smokeless Tobacco Status Smokeless Tobacco Quit Date Source    -- -- Never Used -- Provider            Alcohol Use as of 10/23/2019     Alcohol Use Drinks/Week Alcohol/Week Comments Source    Yes -- -- last drink, about 1 month ago per pt Provider    Frequency Standard Drinks Binge Drinking        -- -- --              Drug Use as of 10/23/2019     Drug Use Types Frequency Comments Source    Yes  Marijuana -- last used about 1-2 months ago Provider            Sexual Activity as of 10/23/2019     Sexually Active Birth Control Partners Comments Source    Not Currently -- -- -- Provider            Activities of Daily Living as of 10/23/2019    None           Social Documentation as of 10/23/2019    None           Occupational as of 10/23/2019    None           Socioeconomic as of 10/23/2019     Marital Status Spouse Name Number of Children Years Education  Education Level Preferred Language Ethnicity Race Source    Single -- -- -- -- English /White White --    Financial Resource Strain Food Insecurity: Worry Food Insecurity: Inability Transportation Needs: Medical Transportation Needs: Non-medical    -- -- -- -- --            Pertinent History     Question Response Comments    Lives with -- --    Place in Birth Order -- --    Lives in -- --    Number of Siblings -- --    Raised by -- --    Legal Involvement -- --    Childhood Trauma -- --    Criminal History of -- --    Financial Status -- --    Highest Level of Education -- --    Does patient have access to a firearm? -- --     Service -- --    Primary Leisure Activity -- --    Spirituality -- --        Past Medical History:   Diagnosis Date    Alcohol abuse     Cirrhosis     Pancreatitis      Past Surgical History:   Procedure Laterality Date    GASTRIC BYPASS      [facility]     Family History     None        Tobacco Use    Smoking status: Never Smoker    Smokeless tobacco: Never Used   Substance and Sexual Activity    Alcohol use: Yes     Comment: last drink, about 1 month ago per pt    Drug use: Yes     Types: Marijuana     Comment: last used about 1-2 months ago    Sexual activity: Not Currently     Review of patient's allergies indicates:  No Known Allergies    No current facility-administered medications on file prior to encounter.      Current Outpatient Medications on File Prior to Encounter   Medication Sig    furosemide (LASIX) 40 MG tablet Take 1 tablet (40 mg total) by mouth once daily.    pantoprazole (PROTONIX) 20 MG tablet Take 20 mg by mouth once daily.    potassium chloride SA (K-DUR,KLOR-CON) 20 MEQ tablet TAKE 1 TABLET(20 MEQ) BY MOUTH EVERY DAY    spironolactone (ALDACTONE) 100 MG tablet Take 1 tablet (100 mg total) by mouth once daily.    traMADol (ULTRAM) 50 mg tablet Take 1 tablet (50 mg total) by mouth every 8 (eight) hours as needed for Pain. Medically necessary  "for greater than 7 days.    VIRT-CAPS 1 mg Cap Take 1 mg by mouth once daily.     Psychotherapeutics (From admission, onward)    None        Review of Systems   Constitutional: Negative for chills and fever.   HENT: Negative for sore throat.    Respiratory: Negative for cough and shortness of breath.    Cardiovascular: Negative for chest pain.   Gastrointestinal: Positive for abdominal distention and abdominal pain. Negative for diarrhea, nausea and vomiting.   Genitourinary: Negative for dysuria and hematuria.     Strengths and Liabilities: Liability: Patient has no suport network., Liability: Patient has poor health., Liability: Patient has poor judgment    Objective:     Vital Signs (Most Recent):  Temp: 97.8 °F (36.6 °C) (10/23/19 0745)  Pulse: 89 (10/23/19 0745)  Resp: 20 (10/23/19 0745)  BP: (!) 116/56 (10/23/19 0745)  SpO2: 95 % (10/23/19 0745) Vital Signs (24h Range):  Temp:  [97.7 °F (36.5 °C)-98 °F (36.7 °C)] 97.8 °F (36.6 °C)  Pulse:  [89-95] 89  Resp:  [20-22] 20  SpO2:  [94 %-97 %] 95 %  BP: ()/(55-60) 116/56     Height: 5' 9" (175.3 cm)  Weight: 108.6 kg (239 lb 6.7 oz)  Body mass index is 35.36 kg/m².      Intake/Output Summary (Last 24 hours) at 10/23/2019 1238  Last data filed at 10/23/2019 0600  Gross per 24 hour   Intake 60 ml   Output 20 ml   Net 40 ml         Physical Exam   Constitutional: No distress.   HENT:   Head: Normocephalic and atraumatic.   Eyes: EOM are normal. Scleral icterus is present.   Neck: Normal range of motion.   Pulmonary/Chest: Effort normal.   Abdominal: He exhibits distension.   Psychiatric:   Mental Status Exam:  Appearance: older than stated age, jaundiced with scleral icterus  Level of Consciousness: Alert  Behavior/Cooperation: normal, friendly and cooperative  Psychomotor: unremarkable   Speech: normal tone, normal pitch, normal volume, slowed  Language: english, fluent  Orientation: grossly intact, person, place, situation, time/date  Attention " "Span/Concentration: unable to spell "WORLD" backwards, performed well on SAVE A HAART  Memory: mildly repaired with remote memory when conveying drinking/medical history, recalled 2/3 words after 3 minutes  Mood: "tired"  Affect: normal  Thought Process: linear, normal and logical  Associations: normal and logical  Thought Content: normal, no suicidality, no homicidality, delusions, or paranoia   Perception: no observable RIS  Fund of Knowledge: Aware of current events  Abstraction: proverbs were abstract, similarities were abstract  Insight: poor  Judgment: poor      NEUROLOGICAL EXAMINATION:     CRANIAL NERVES     CN III, IV, VI   Extraocular motions are normal.     Significant Labs: All pertinent labs within the past 24 hours have been reviewed.    Significant Imaging: I have reviewed all pertinent imaging results/findings within the past 24 hours.    Assessment/Plan:     Alcohol use disorder, severe, dependence  ASSESSMENT     Buck Gann is a 54 y.o. male with a past psychiatry history of alcohol use disorder who presented to [facility] due to decompensated liver cirrhosis. Psychiatry was consulted to address the patient's history of LTE.    Patient was vague and appeared to be minimizing history and amount of alcohol use. Reports that he was told in February 2019 that he needed a new liver yet continued casually drinking. Reports last drink was a month ago. Patient also states at his heaviest intake was only drinking one six pack of beer per week for four years. Reports history of previous inpatient rehab and sobriety for one year. Pending PETH testing. Pt is high risk.    IMPRESSION  Alcohol use disorder - severe    RECOMMENDATION(S)     1. Scheduled Medication(s):  Per primary team    2. PRN Medication(s):  Per primary team    3. Other:  · HIGH risk at this time  · Recommend serial PETH testing  · Recommend serial urine toxicology screening  · Recommend referral to and completion of day program for education on " alcohol abuse, for coping/behavioral skills to reinforce sobriety, as well as to attend AA meetings.    In cases of emergency, daily coverage provided by Acute/ER Psych MD/NP/SW, with contact numbers located in [facility] On Call Schedule    Case discussed with staff addiction psychiatrist: Ramiro Brian MD           Total Time:  60 minutes      Cherry Kearney DO   Psychiatry  [facility]

## 2019-10-23 NOTE — ASSESSMENT & PLAN NOTE
54 y.o. male with a past psychiatry history of alcohol use disorder who presented to [facility] due to decompensated liver cirrhosis.Hepatology consulted for decompensated liver failure and transplant evaluation.    PLAN  - PETH test (pending)  - neprology consult; continue dialysis?  - addiction psych consult  - patient is high risk for liver transplant    -- reports last drink 1 month ago, will most likely need addiction rehab program prior to liver transplant evaluation    -- poor social support   -- will have addiction psych and  talk to patient   - consider pulmonary consult   -- patient with requriing 3L NC, R-pleural effusion, possible thoracentesis

## 2019-10-23 NOTE — ASSESSMENT & PLAN NOTE
Likely initially due to decompensated cirrhosis, but now with oliguria and dark sediment likely signifies ATN has occurred.  Continue to monitor UOP and consult Nephrology.  Has HD line in L subclavian if plan is to continue HD.  Will hold diuretics pending their opinion.

## 2019-10-24 PROBLEM — D63.8 ANEMIA OF CHRONIC DISEASE: Status: ACTIVE | Noted: 2019-10-24

## 2019-10-24 PROBLEM — D68.9 COAGULOPATHY: Status: ACTIVE | Noted: 2019-10-24

## 2019-10-24 PROBLEM — N17.0 ATN (ACUTE TUBULAR NECROSIS): Status: ACTIVE | Noted: 2019-10-24

## 2019-10-24 PROBLEM — D69.6 THROMBOCYTOPENIA: Status: ACTIVE | Noted: 2019-10-24

## 2019-10-24 PROBLEM — E44.0 MODERATE PROTEIN-CALORIE MALNUTRITION: Status: ACTIVE | Noted: 2019-10-24

## 2019-10-24 PROBLEM — I95.9 HYPOTENSION: Status: ACTIVE | Noted: 2019-10-24

## 2019-10-24 PROBLEM — J91.8 PLEURAL EFFUSION ASSOCIATED WITH HEPATIC DISORDER: Status: ACTIVE | Noted: 2019-10-24

## 2019-10-24 PROBLEM — E87.20 METABOLIC ACIDOSIS: Status: ACTIVE | Noted: 2019-10-24

## 2019-10-24 PROBLEM — K76.9 PLEURAL EFFUSION ASSOCIATED WITH HEPATIC DISORDER: Status: ACTIVE | Noted: 2019-10-24

## 2019-10-24 LAB
ALBUMIN SERPL BCP-MCNC: 2.4 G/DL (ref 3.5–5.2)
ALP SERPL-CCNC: 126 U/L (ref 55–135)
ALT SERPL W/O P-5'-P-CCNC: 12 U/L (ref 10–44)
AMPHET+METHAMPHET UR QL: NEGATIVE
ANION GAP SERPL CALC-SCNC: 7 MMOL/L (ref 8–16)
ANISOCYTOSIS BLD QL SMEAR: ABNORMAL
AST SERPL-CCNC: 54 U/L (ref 10–40)
BARBITURATES UR QL SCN>200 NG/ML: NEGATIVE
BASO STIPL BLD QL SMEAR: ABNORMAL
BASOPHILS # BLD AUTO: 0.06 K/UL (ref 0–0.2)
BASOPHILS NFR BLD: 0.5 % (ref 0–1.9)
BENZODIAZ UR QL SCN>200 NG/ML: NEGATIVE
BILIRUB SERPL-MCNC: 22 MG/DL (ref 0.1–1)
BUN SERPL-MCNC: 27 MG/DL (ref 6–20)
BURR CELLS BLD QL SMEAR: ABNORMAL
BZE UR QL SCN: NEGATIVE
CALCIUM SERPL-MCNC: 8.6 MG/DL (ref 8.7–10.5)
CANNABINOIDS UR QL SCN: NEGATIVE
CHLORIDE SERPL-SCNC: 106 MMOL/L (ref 95–110)
CO2 SERPL-SCNC: 21 MMOL/L (ref 23–29)
CREAT SERPL-MCNC: 3.4 MG/DL (ref 0.5–1.4)
CREAT UR-MCNC: 169 MG/DL (ref 23–375)
DIFFERENTIAL METHOD: ABNORMAL
EOSINOPHIL # BLD AUTO: 0.7 K/UL (ref 0–0.5)
EOSINOPHIL NFR BLD: 5.8 % (ref 0–8)
ERYTHROCYTE [DISTWIDTH] IN BLOOD BY AUTOMATED COUNT: 24 % (ref 11.5–14.5)
EST. GFR  (AFRICAN AMERICAN): 22.2 ML/MIN/1.73 M^2
EST. GFR  (NON AFRICAN AMERICAN): 19.2 ML/MIN/1.73 M^2
ETHANOL UR-MCNC: <10 MG/DL
FIBRINOGEN PPP-MCNC: 136 MG/DL (ref 182–366)
GLUCOSE SERPL-MCNC: 90 MG/DL (ref 70–110)
HAPTOGLOB SERPL-MCNC: 24 MG/DL (ref 30–250)
HCT VFR BLD AUTO: 26.3 % (ref 40–54)
HGB BLD-MCNC: 8.6 G/DL (ref 14–18)
IMM GRANULOCYTES # BLD AUTO: 0.11 K/UL (ref 0–0.04)
IMM GRANULOCYTES NFR BLD AUTO: 0.9 % (ref 0–0.5)
INR PPP: 2.2 (ref 0.8–1.2)
LDH SERPL L TO P-CCNC: 166 U/L (ref 110–260)
LYMPHOCYTES # BLD AUTO: 1.2 K/UL (ref 1–4.8)
LYMPHOCYTES NFR BLD: 10.1 % (ref 18–48)
MAGNESIUM SERPL-MCNC: 1.9 MG/DL (ref 1.6–2.6)
MCH RBC QN AUTO: 34.1 PG (ref 27–31)
MCHC RBC AUTO-ENTMCNC: 32.7 G/DL (ref 32–36)
MCV RBC AUTO: 104 FL (ref 82–98)
METHADONE UR QL SCN>300 NG/ML: NEGATIVE
MONOCYTES # BLD AUTO: 1.1 K/UL (ref 0.3–1)
MONOCYTES NFR BLD: 8.9 % (ref 4–15)
NEUTROPHILS # BLD AUTO: 8.7 K/UL (ref 1.8–7.7)
NEUTROPHILS NFR BLD: 73.8 % (ref 38–73)
NRBC BLD-RTO: 0 /100 WBC
OPIATES UR QL SCN: NORMAL
PCP UR QL SCN>25 NG/ML: NEGATIVE
PHOSPHATE SERPL-MCNC: 3.8 MG/DL (ref 2.7–4.5)
PLATELET # BLD AUTO: 37 K/UL (ref 150–350)
PLATELET BLD QL SMEAR: ABNORMAL
PMV BLD AUTO: 11.9 FL (ref 9.2–12.9)
POIKILOCYTOSIS BLD QL SMEAR: SLIGHT
POLYCHROMASIA BLD QL SMEAR: ABNORMAL
POTASSIUM SERPL-SCNC: 4.2 MMOL/L (ref 3.5–5.1)
PREALB SERPL-MCNC: 3 MG/DL (ref 20–43)
PROT SERPL-MCNC: 5.1 G/DL (ref 6–8.4)
PROTHROMBIN TIME: 21.3 SEC (ref 9–12.5)
RBC # BLD AUTO: 2.52 M/UL (ref 4.6–6.2)
SODIUM SERPL-SCNC: 134 MMOL/L (ref 136–145)
TOXICOLOGY INFORMATION: NORMAL
WBC # BLD AUTO: 11.79 K/UL (ref 3.9–12.7)

## 2019-10-24 PROCEDURE — 84134 ASSAY OF PREALBUMIN: CPT

## 2019-10-24 PROCEDURE — 83615 LACTATE (LD) (LDH) ENZYME: CPT

## 2019-10-24 PROCEDURE — 99232 SBSQ HOSP IP/OBS MODERATE 35: CPT | Mod: ,,, | Performed by: INTERNAL MEDICINE

## 2019-10-24 PROCEDURE — 97535 SELF CARE MNGMENT TRAINING: CPT | Mod: NTX

## 2019-10-24 PROCEDURE — 99223 PR INITIAL HOSPITAL CARE,LEVL III: ICD-10-PCS | Mod: NTX,,, | Performed by: INTERNAL MEDICINE

## 2019-10-24 PROCEDURE — 99223 1ST HOSP IP/OBS HIGH 75: CPT | Mod: NTX,,, | Performed by: INTERNAL MEDICINE

## 2019-10-24 PROCEDURE — 25000003 PHARM REV CODE 250: Mod: NTX | Performed by: HOSPITALIST

## 2019-10-24 PROCEDURE — 83735 ASSAY OF MAGNESIUM: CPT

## 2019-10-24 PROCEDURE — 84100 ASSAY OF PHOSPHORUS: CPT

## 2019-10-24 PROCEDURE — 97165 OT EVAL LOW COMPLEX 30 MIN: CPT | Mod: NTX

## 2019-10-24 PROCEDURE — 99232 PR SUBSEQUENT HOSPITAL CARE,LEVL II: ICD-10-PCS | Mod: NTX,,, | Performed by: PSYCHIATRY & NEUROLOGY

## 2019-10-24 PROCEDURE — 63600175 PHARM REV CODE 636 W HCPCS: Mod: NTX | Performed by: HOSPITALIST

## 2019-10-24 PROCEDURE — 85384 FIBRINOGEN ACTIVITY: CPT

## 2019-10-24 PROCEDURE — 97161 PT EVAL LOW COMPLEX 20 MIN: CPT | Mod: NTX

## 2019-10-24 PROCEDURE — 99233 PR SUBSEQUENT HOSPITAL CARE,LEVL III: ICD-10-PCS | Mod: ,,, | Performed by: HOSPITALIST

## 2019-10-24 PROCEDURE — 27000221 HC OXYGEN, UP TO 24 HOURS: Mod: NTX

## 2019-10-24 PROCEDURE — 90935 HEMODIALYSIS ONE EVALUATION: CPT

## 2019-10-24 PROCEDURE — 99233 SBSQ HOSP IP/OBS HIGH 50: CPT | Mod: ,,, | Performed by: HOSPITALIST

## 2019-10-24 PROCEDURE — 63600175 PHARM REV CODE 636 W HCPCS: Performed by: GENERAL PRACTICE

## 2019-10-24 PROCEDURE — 20600001 HC STEP DOWN PRIVATE ROOM

## 2019-10-24 PROCEDURE — 80053 COMPREHEN METABOLIC PANEL: CPT

## 2019-10-24 PROCEDURE — 85025 COMPLETE CBC W/AUTO DIFF WBC: CPT

## 2019-10-24 PROCEDURE — 85610 PROTHROMBIN TIME: CPT

## 2019-10-24 PROCEDURE — 80307 DRUG TEST PRSMV CHEM ANLYZR: CPT

## 2019-10-24 PROCEDURE — 99232 PR SUBSEQUENT HOSPITAL CARE,LEVL II: ICD-10-PCS | Mod: ,,, | Performed by: INTERNAL MEDICINE

## 2019-10-24 PROCEDURE — 94761 N-INVAS EAR/PLS OXIMETRY MLT: CPT | Mod: NTX

## 2019-10-24 PROCEDURE — 97530 THERAPEUTIC ACTIVITIES: CPT | Mod: NTX

## 2019-10-24 PROCEDURE — 99232 SBSQ HOSP IP/OBS MODERATE 35: CPT | Mod: NTX,,, | Performed by: PSYCHIATRY & NEUROLOGY

## 2019-10-24 PROCEDURE — 83010 ASSAY OF HAPTOGLOBIN QUANT: CPT

## 2019-10-24 RX ORDER — GENTAMICIN SULFATE 40 MG/ML
80 INJECTION, SOLUTION INTRAMUSCULAR; INTRAVENOUS
Status: DISCONTINUED | OUTPATIENT
Start: 2019-10-24 | End: 2019-10-26 | Stop reason: HOSPADM

## 2019-10-24 RX ORDER — SODIUM CHLORIDE 9 MG/ML
INJECTION, SOLUTION INTRAVENOUS ONCE
Status: DISCONTINUED | OUTPATIENT
Start: 2019-10-24 | End: 2019-10-25

## 2019-10-24 RX ORDER — MIDODRINE HYDROCHLORIDE 5 MG/1
15 TABLET ORAL 3 TIMES DAILY
Status: DISCONTINUED | OUTPATIENT
Start: 2019-10-24 | End: 2019-10-26 | Stop reason: HOSPADM

## 2019-10-24 RX ADMIN — PANTOPRAZOLE SODIUM 20 MG: 20 TABLET, DELAYED RELEASE ORAL at 09:10

## 2019-10-24 RX ADMIN — MIDODRINE HYDROCHLORIDE 15 MG: 5 TABLET ORAL at 08:10

## 2019-10-24 RX ADMIN — GENTAMICIN SULFATE 80 MG: 40 INJECTION, SOLUTION INTRAMUSCULAR; INTRAVENOUS at 05:10

## 2019-10-24 RX ADMIN — MIDODRINE HYDROCHLORIDE 10 MG: 5 TABLET ORAL at 08:10

## 2019-10-24 RX ADMIN — PHYTONADIONE 10 MG: 10 INJECTION, EMULSION INTRAMUSCULAR; INTRAVENOUS; SUBCUTANEOUS at 09:10

## 2019-10-24 RX ADMIN — TRAMADOL HYDROCHLORIDE 50 MG: 50 TABLET ORAL at 08:10

## 2019-10-24 RX ADMIN — LIDOCAINE 1 PATCH: 50 PATCH TOPICAL at 01:10

## 2019-10-24 RX ADMIN — MIDODRINE HYDROCHLORIDE 15 MG: 5 TABLET ORAL at 02:10

## 2019-10-24 RX ADMIN — FUROSEMIDE 80 MG: 10 INJECTION, SOLUTION INTRAMUSCULAR; INTRAVENOUS at 09:10

## 2019-10-24 NOTE — PT/OT/SLP EVAL
Physical Therapy Evaluation    Patient Name:  Buck Gann   MRN:  [id number]    Recommendations:     Discharge Recommendations:  rehabilitation facility   Discharge Equipment Recommendations: (TBD)   Barriers to discharge: Inaccessible home, Decreased caregiver support and pt requiring increased assistance at this time    Assessment:     Buck Gann is a 54 y.o. male admitted with a medical diagnosis of <principal problem not specified>.  He presents with the following impairments/functional limitations:  weakness, impaired endurance, impaired functional mobilty, impaired cognition, impaired balance, gait instability, decreased lower extremity function, decreased safety awareness, impaired coordination, impaired cardiopulmonary response to activity. Pt confused, with delayed reaction times and reduced attention span throughout eval but able to actively participate. Pt performed bed mobility with ModA and transfers with RW and ModA. Pt with nausea throughout, in agreement in t/f to chair but then experienced increased nausea to returned to supine. Ordered RW to room to improve safety with mobility. Pt safe to transfer with RW and assist x 1 person. Pt would benefit from skilled PT services to improve functional mobility, balance, ambulation, and endurance to return to PLOF and reduce fall risk.     Rehab Prognosis: Good; patient would benefit from acute skilled PT services to address these deficits and reach maximum level of function.    Recent Surgery: * No surgery found *      Plan:     During this hospitalization, patient to be seen 3 x/week to address the identified rehab impairments via gait training, therapeutic activities, therapeutic exercises, neuromuscular re-education and progress toward the following goals:    · Plan of Care Expires:  11/23/19    Subjective     Chief Complaint: nausea  Patient/Family Comments/goals: return to PLOF   Pain/Comfort:  · Pain Rating 1: 0/10  · Pain Rating Post-Intervention 1:  0/10    Patients cultural, spiritual, Protestant conflicts given the current situation: no    Living Environment:  Pt lives alone in a trailer home with 4 LELE with RHR with a tub shower.     Prior to admission, patients level of function was Independent with ADLs, furniture walking household distances, using SPC for limited community distances, not driving.  Equipment used at home: cane, straight.  DME owned (not currently used): none.  Upon discharge, patient will have assistance from - no one.    Objective:     Communicated with RN prior to session.  Patient found supine with telemetry, oxygen, peripheral IV  upon PT entry to room.    General Precautions: Standard, fall   Orthopedic Precautions:N/A   Braces: N/A     Exams:  · Cognitive Exam:  Patient is oriented to Person, Place, Time and Situation  · Sensation:    · -       Intact  · RLE ROM: WFL  · RLE Strength: Grossly 3+ to 4 out of 5  · LLE ROM: WFL  · LLE Strength: Grossly 3+ to 4 out of 5    Functional Mobility:  · Bed Mobility:     · Scooting: moderate assistance  · Supine to Sit: moderate assistance  · Sit to Supine: moderate assistance  · Transfers:     · Sit to Stand:  moderate assistance with rolling walker  · Balance:   · Sitting: SBA   · Standing: CGA       Therapeutic Activities and Exercises:    -Pt educated on:              -PT roles, expectations, and POC               -Safety with mobility              -Benefits of OOB activities to increase strength and functional mobility               -Performing ther ex for increasing LE ROM and strength              -Discharge recommendations       AM-PAC 6 CLICK MOBILITY  Total Score:10     Patient left supine with all lines intact, call button in reach, bed alarm on and RN notified.    GOALS:   Multidisciplinary Problems     Physical Therapy Goals        Problem: Physical Therapy Goal    Goal Priority Disciplines Outcome Goal Variances Interventions   Physical Therapy Goal     PT, PT/OT Ongoing,  Progressing     Description:  Goals to be met by: 2019     Patient will increase functional independence with mobility by performin. Supine to sit with MInimal Assistance  2. Sit to supine with MInimal Assistance  3. Sit to stand transfer with Stand-by Assistance  4. Gait  x 50 feet with Stand-by Assistance using LRAD.   5. Ascend/descend 4 stair with right Handrails Minimal Assistance.                       History:     Past Medical History:   Diagnosis Date    Alcohol abuse     Cirrhosis     Pancreatitis        Past Surgical History:   Procedure Laterality Date    GASTRIC BYPASS      [facility]       Time Tracking:     PT Received On: 10/24/19  PT Start Time: 956     PT Stop Time: 1034  PT Total Time (min): 38 min     Billable Minutes: Evaluation 15 and Therapeutic Activity 23      Parris Flores, PT  10/24/2019   Size Of Lesion In Cm (Optional): 0 Detail Level: Zone

## 2019-10-24 NOTE — ASSESSMENT & PLAN NOTE
54 y.o male with h/o decompensated liver cirrhosis, hepatorenal syndome presents with R sided pleural effusion. 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     - In order to perform Thoracentesis Platelet of >50 and INR <1.5 are recommended   - will plan for thoracentesis for symptomatic relief If labs improved by tomorrow 10/25

## 2019-10-24 NOTE — PROGRESS NOTES
Arrived to dialysis via stretcher. AAO x 3. Reoriented to time. Agreed to 3.5 hour HD Tx.  UF goal  2L. Lt chest CVC lumens aspirated and flushed.

## 2019-10-24 NOTE — SUBJECTIVE & OBJECTIVE
Interval History:  inpt pulmonology consult for pleural effusion involving the entire R lung. Pt currently 96% on 3L NC. Complains of SOB and cough. In no acute respiratory distress    Objective:     Vital Signs (Most Recent):  Temp: 98 °F (36.7 °C) (10/24/19 0717)  Pulse: 81 (10/24/19 0717)  Resp: 20 (10/24/19 0717)  BP: (!) 111/56 (10/24/19 0717)  SpO2: 95 % (10/24/19 0717) Vital Signs (24h Range):  Temp:  [97.2 °F (36.2 °C)-98 °F (36.7 °C)] 98 °F (36.7 °C)  Pulse:  [81-96] 81  Resp:  [17-20] 20  SpO2:  [95 %-97 %] 95 %  BP: (100-111)/(50-63) 111/56     Weight: 110.2 kg (242 lb 15.2 oz)  Body mass index is 35.88 kg/m².      Intake/Output Summary (Last 24 hours) at 10/24/2019 1004  Last data filed at 10/24/2019 0903  Gross per 24 hour   Intake 840 ml   Output 150 ml   Net 690 ml       Physical Exam   Constitutional: He is oriented to person, place, and time. He appears well-developed and well-nourished.   Yellow discoloration of skin    HENT:   Head: Normocephalic and atraumatic.   Eyes: Pupils are equal, round, and reactive to light. EOM are normal. Scleral icterus is present.   Neck: Normal range of motion. Neck supple. No JVD present. No tracheal deviation present.   Cardiovascular: Normal rate and regular rhythm. Exam reveals no gallop and no friction rub.   Murmur heard.  2/6 holosystolic murmur more pronounced over the RUSB   Pulmonary/Chest: Effort normal and breath sounds normal. No accessory muscle usage. No respiratory distress. He has no wheezes. He has no rales. He exhibits no tenderness.   Decrease breath sound over upper, mid, and lower R lung fields   Abdominal: Bowel sounds are normal. He exhibits distension. There is no tenderness. There is no rebound and no guarding.   Musculoskeletal: Normal range of motion.   Neurological: He is alert and oriented to person, place, and time.       Skin: Skin is warm and dry.   Multiple scattered areas of purpura and petechiae over upper chest, abdomen and upper  extremities    Nursing note and vitals reviewed.      Vents:       Lines/Drains/Airways     Peripheral Intravenous Line                 Peripheral IV - Single Lumen 10/23/19 1530 20 G;1 3/4 in Left Forearm less than 1 day                Significant Labs:    CBC/Anemia Profile:  Recent Labs   Lab 10/23/19  0506 10/24/19  0502   WBC 10.92 11.79   HGB 9.0* 8.6*   HCT 27.9* 26.3*   PLT 33* 37*   * 104*   RDW 24.3* 24.0*        Chemistries:  Recent Labs   Lab 10/23/19  0506 10/24/19  0502    134*   K 4.3 4.2    106   CO2 21* 21*   BUN 21* 27*   CREATININE 2.8* 3.4*   CALCIUM 8.5* 8.6*   ALBUMIN 2.7* 2.4*   PROT 5.5* 5.1*   BILITOT 24.0* 22.0*   ALKPHOS 132 126   ALT 14 12   AST 53* 54*   MG 1.9 1.9   PHOS 3.1 3.8       All pertinent labs within the past 24 hours have been reviewed.    Significant Imaging:  I have reviewed and interpreted all pertinent imaging results/findings within the past 24 hours.

## 2019-10-24 NOTE — PLAN OF CARE
Pt remains AAOx3 but forgetful at times. On 2.5 L O2 satting 95%.  Call light and personal belongings within reach. Bed alarm activated. Pt is oliguric. Unable to collect urine sample at this time. Plan of care reviewed with pt, all questions and concerns were addressed. Will continue to monitor.

## 2019-10-24 NOTE — CONSULTS
[facility]  Pulmonology  Consult Note    Patient Name: Buck Gann  MRN: [id number]  Admission Date: 10/23/2019  Hospital Length of Stay: 1 days  Code Status: Full Code  Attending Physician: Steve Vang MD  Primary Care Provider: Connie Sahni MD   Principal Problem: <principal problem not specified>    Consults  Subjective:     HPI:  54M with alcoholic cirrhosis presents as transfer from [facility] for management of decompensated cirrhosis and oliguria.  He was admitted there 10/16 with 3 weeks of progressively worsening dyspnea, abdominal distension, and fatigue.  He was admitted to the ICU in respiratory distress and put on BiPAP.  He also had DEON with his creatinine up to 3 from 1.6 and apparently wasn't responding sufficiently to diuresis so within a day or so a dialysis line was placed and dialysis was begun for volume removal, after which his respiratory status began to improve.  He was able to wean down to 2-3 L NC O2.  His urine output has been scant, however.  A dinh was placed which has had a small amount of muddy colored output.  He had a 2L paracentesis during this hospitalization as well.  He has been having some persistent back and abdominal pain while in the hospital for which he was receiving morphine.  He feels considerably better than he did a week ago.    He follows with Dr Smith as an outpatient and was referred for outpatient transplant workup a few months ago but due to financial concerns has so far not made it over here to begin the evaluation.  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  He admits to some occasional alcohol use over the past several months.      Interval History:  inpt pulmonology consult for pleural effusion involving the entire R lung. Pt currently 96% on 3L NC. Complains of SOB and cough. In no  acute respiratory distress    Objective:     Vital Signs (Most Recent):  Temp: 98 °F (36.7 °C) (10/24/19 0717)  Pulse: 81 (10/24/19 0717)  Resp: 20 (10/24/19 0717)  BP: (!) 111/56 (10/24/19 0717)  SpO2: 95 % (10/24/19 0717) Vital Signs (24h Range):  Temp:  [97.2 °F (36.2 °C)-98 °F (36.7 °C)] 98 °F (36.7 °C)  Pulse:  [81-96] 81  Resp:  [17-20] 20  SpO2:  [95 %-97 %] 95 %  BP: (100-111)/(50-63) 111/56     Weight: 110.2 kg (242 lb 15.2 oz)  Body mass index is 35.88 kg/m².      Intake/Output Summary (Last 24 hours) at 10/24/2019 1004  Last data filed at 10/24/2019 0903  Gross per 24 hour   Intake 840 ml   Output 150 ml   Net 690 ml       Physical Exam   Constitutional: He is oriented to person, place, and time. He appears well-developed and well-nourished.   Yellow discoloration of skin    HENT:   Head: Normocephalic and atraumatic.   Eyes: Pupils are equal, round, and reactive to light. EOM are normal. Scleral icterus is present.   Neck: Normal range of motion. Neck supple. No JVD present. No tracheal deviation present.   Cardiovascular: Normal rate and regular rhythm. Exam reveals no gallop and no friction rub.   Murmur heard.  2/6 holosystolic murmur more pronounced over the RUSB   Pulmonary/Chest: Effort normal and breath sounds normal. No accessory muscle usage. No respiratory distress. He has no wheezes. He has no rales. He exhibits no tenderness.   Decrease breath sound over upper, mid, and lower R lung fields   Abdominal: Bowel sounds are normal. He exhibits distension. There is no tenderness. There is no rebound and no guarding.   Musculoskeletal: Normal range of motion.   Neurological: He is alert and oriented to person, place, and time.       Skin: Skin is warm and dry.   Multiple scattered areas of purpura and petechiae over upper chest, abdomen and upper extremities    Nursing note and vitals reviewed.      Vents:       Lines/Drains/Airways     Peripheral Intravenous Line                 Peripheral IV -  Single Lumen 10/23/19 1530 20 G;1 3/4 in Left Forearm less than 1 day                Significant Labs:    CBC/Anemia Profile:  Recent Labs   Lab 10/23/19  0506 10/24/19  0502   WBC 10.92 11.79   HGB 9.0* 8.6*   HCT 27.9* 26.3*   PLT 33* 37*   * 104*   RDW 24.3* 24.0*        Chemistries:  Recent Labs   Lab 10/23/19  0506 10/24/19  0502    134*   K 4.3 4.2    106   CO2 21* 21*   BUN 21* 27*   CREATININE 2.8* 3.4*   CALCIUM 8.5* 8.6*   ALBUMIN 2.7* 2.4*   PROT 5.5* 5.1*   BILITOT 24.0* 22.0*   ALKPHOS 132 126   ALT 14 12   AST 53* 54*   MG 1.9 1.9   PHOS 3.1 3.8       All pertinent labs within the past 24 hours have been reviewed.    Significant Imaging:  I have reviewed and interpreted all pertinent imaging results/findings within the past 24 hours.    Assessment/Plan:     Pleural effusion associated with hepatic disorder  54 y.o male with h/o decompensated liver cirrhosis, hepatorenal syndome presents with R sided pleural effusion. 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     - In order to perform Thoracentesis Platelet of >50 and INR <1.5 are recommended   - will plan for thoracentesis for symptomatic relief If labs improved by tomorrow 10/25           Thank you for your consult. I will follow-up with patient. Please contact us if you have any additional questions.     Artem Raya MD  Pulmonology  [facility]

## 2019-10-24 NOTE — PROGRESS NOTES
ADDICTION CONSULT FOLLOW UP VISIT     DEPARTMENT:  Psychiatry  SITE: [facility] [address]    DATE OF ADMISSION: 10/23/2019  1:40 AM  LENGTH OF STAY: 1 days    EXAMINING PRACTITIONER: Ramiro Brian      SUBJECTIVE:     HISTORY    Patient Name: Buck Gann  YOB: 1965    CHIEF COMPLAINT   Buck Gann is a 54 y.o. male who is being seen today for a follow up visit by the addiction psychiatry consult service.  Buck Gann presents with the chief complaint of: alcohol use disorder    HPI     Patient seen by me today.  He denies depression or SI.  We discussed feedback from hepatology and primary team, helping him to process their recommendations.  Confusion remains but he is overall noted to be oriented and able to participate in the conversation.  No new issues noted.      PFSH: The patient's past medical and family histories have been reviewed and updated as appropriate within the electronic medical record system.      ROS   Musculoskeletal: +pain 5/10  GI: no N/V    PSYCHOTROPIC MEDICATIONS   None      OBJECTIVE:     EXAMINATION    Vitals:    10/24/19 0531 10/24/19 0717 10/24/19 0950 10/24/19 1041   BP:  (!) 111/56  (!) 104/51   BP Location:  Right arm     Patient Position:  Lying  Lying   Pulse:  81 80 80   Resp:  20  18   Temp:  98 °F (36.7 °C)  97.5 °F (36.4 °C)   TempSrc:  Oral  Oral   SpO2:  95% (!) 92% 95%   Weight: 110.2 kg (242 lb 15.2 oz)      Height:           CONSTITUTIONAL  General Appearance: stated age, jaundiced, in hospital garb    MUSCULOSKELETAL  Muscle Strength and Tone: no tremor noted  Gait and Station: not assessed    PSYCHIATRIC   Orientation: to person, place, time  Speech: decreased spontaneity, briefly answers questions  Language: fluent english, repeats words/phrases  Mood: ok  Affect: friendly but low key and subdued  Thought Process: linear  Associations: intact, no loosening of associations  Thought Content: no SI  Memory: impaired  Attention:  distractible  Fund of Knowledge: intact  Insight: intact  Judgment: impaired (continued alcohol use)      ASSESSMENT:     DIAGNOSES    Alcohol Use Disorder      STATUS/PROGRESS  Based on the examination today, the patient's problem(s) is/are:   stable      PLAN:     MEDICAL DECISION MAKING    PROBLEM LIST    1. Liver failure    2. Anasarca    3. Pre-transplant evaluation for liver transplant    4. Alcoholic cirrhosis of liver with ascites    5. Alcohol use disorder, severe, dependence          MANAGEMENT PLAN, TREATMENT GOALS & CLINICAL REASONING    Supportive psychotherapy provided to patient.  No psychotropics at this time.    · Patient counseled on abstinence from alcohol and substances of abuse (illicit and prescription).  · Relapse prevention and motivational interviewing provided.  · Education provided on 12 step recovery programs.        DIAGNOSTIC TESTING  The chart was reviewed for recent diagnostic investigations, and pertinent results are noted below.  10/23/19 - [facility] pending

## 2019-10-24 NOTE — PROGRESS NOTES
Progress Note   Hospital Medicine         Patient Name: Buck Gann  MRN:  [id number]  [facility] Medicine Team: [facility] Steve Vang MD  Date of Admission:  10/23/2019     Length of Stay:  LOS: 1 day   Expected Discharge Date: 10/26/2019  Principal Problem:  Alcoholic cirrhosis of liver with ascites       Subjective:     Interval History/Overnight Events:  Patient doing ok today; as per transplant hepatology, patient is not a liver transplant candidate currently and would need to go to [facility] for 8 weeks and then be re-evaluated; pulmonology holding off on thoracentesis due to low platelet count and currently not having SOB and on 2L;   - spoke with patient about code status and patient states he wants to be full code despite knowing that he is not a liver transplant candidate currently; will have palliative care come by tomorrow;   - plan is to try to transfer back to [facility] tomorrow if bed available  - called patient's father and mother and updated them on patient condition and plan and they are in understanding; will plan for a peritoneal drain placement tomorrow for palliative purposes    Review of Systems   Constitutional: Negative for chills, fatigue, fever.   HENT: Negative for sore throat, trouble swallowing.    Eyes: Negative for photophobia, visual disturbance.   Respiratory: Negative for cough, shortness of breath.    Cardiovascular: Negative for chest pain, palpitations, leg swelling.   Gastrointestinal: Negative for abdominal pain, constipation, diarrhea, nausea, vomiting.   Endocrine: Negative for cold intolerance, heat intolerance.   Genitourinary: Negative for dysuria, frequency.   Musculoskeletal: Negative for arthralgias, myalgias.   Skin: Negative for rash, wound, erythema   Neurological: Negative for dizziness, syncope, weakness, light-headedness.   Psychiatric/Behavioral: Negative for confusion, hallucinations, anxiety  All other systems reviewed and are negative.    Objective:      Temp:  [97.4 °F (36.3 °C)-98 °F (36.7 °C)]   Pulse:  [70-96]   Resp:  [16-20]   BP: ()/(28-61)   SpO2:  [92 %-96 %]       Physical Exam:  Constitutional: appears weak and ill  Head: Normocephalic and atraumatic.   Mouth/Throat: Oropharynx is clear and moist.   Eyes: EOM are normal. Pupils are equal, round, and reactive to light. No scleral icterus.   Neck: Normal range of motion. Neck supple.   Cardiovascular: Normal rate and regular rhythm.  No murmur heard.  Pulmonary/Chest: Effort normal; decreased BS on right side;   Abdominal: Soft. Bowel sounds are normal.  No distension or tenderness  Musculoskeletal: Normal range of motion. No edema.   Neurological: Alert and oriented to person, place, and time.   Skin: Skin is warm and dry.   Psychiatric: Normal mood and affect. Behavior is normal.     Recent Labs   Lab 10/23/19  0506 10/24/19  0502   WBC 10.92 11.79   HGB 9.0* 8.6*   HCT 27.9* 26.3*   PLT 33* 37*     Recent Labs   Lab 10/23/19  0506 10/24/19  0502    134*   K 4.3 4.2    106   CO2 21* 21*   BUN 21* 27*   CREATININE 2.8* 3.4*   GLU 84 90   CALCIUM 8.5* 8.6*   MG 1.9 1.9   PHOS 3.1 3.8     Recent Labs   Lab 10/23/19  0506 10/24/19  0502   ALKPHOS 132 126   ALT 14 12   AST 53* 54*   ALBUMIN 2.7* 2.4*   PROT 5.5* 5.1*   BILITOT 24.0* 22.0*   INR 2.2* 2.2*     No results for input(s): POCTGLUCOSE in the last 168 hours.     sodium chloride 0.9%   Intravenous Once    lidocaine  1 patch Transdermal Q24H    midodrine  15 mg Oral TID    pantoprazole  20 mg Oral Daily    phytonadione ((AQUA-MEPHYTON) IVPB  10 mg Intravenous Daily       Assessment and Plan     Mr. Buck Gann is a 54 y.o. male who presented to [facility] on 10/23/2019 with     Hospital Course:    Mr. Buck Gann was admitted to Hospital Medicine for management of     Active Hospital Problems    Diagnosis  POA    *Alcoholic cirrhosis of liver with ascites [K70.31]  Yes    Pleural effusion associated with hepatic disorder  [K76.9, J91.8]  Yes    ATN (acute tubular necrosis) [N17.0]  Yes    Hypotension [I95.9]  Yes    Coagulopathy [D68.9]  Yes    Anemia of chronic disease [D63.8]  Yes    Thrombocytopenia [D69.6]  Yes    Metabolic acidosis [E87.2]  Yes    Moderate protein-calorie malnutrition [E44.0]  Yes    DEON (acute kidney injury) [N17.9]  Yes    Acute hypoxemic respiratory failure [J96.01]  Yes    Alcohol use disorder, severe, dependence [F10.20]  Yes    Pre-transplant evaluation for liver transplant [Z01.818]  Not Applicable      Resolved Hospital Problems   No resolved problems to display.     # Decompensated alcohol cirrhosis with ascites  # Severe alcohol dependence  MELD-Na score: 39 at 10/24/2019  5:02 AM  MELD score: 39 at 10/24/2019  5:02 AM  Calculated from:  Serum Creatinine: 3.4 mg/dL at 10/24/2019  5:02 AM  Serum Sodium: 134 mmol/L at 10/24/2019  5:02 AM  Total Bilirubin: 22.0 mg/dL at 10/24/2019  5:02 AM  INR(ratio): 2.2 at 10/24/2019  5:02 AM  Age: 54 years  - patient actively drinking until about a month ago  - PETH pending  - addiction psych states high risk and needs 8 weeks IOP atleast  - U/S liver with doppler reviewed, shows large ascites; planning for IR paracentesis and palliative drain placement  - hepatology consulted and patient is not a liver transplant candidate    # DEON  # ATN  # Metabolic acidosis   - patient oliguric and now on HD;  - nephrology consulted and planning for HD today    # Hypotension  - started on midodrine 15 mg PO TID    # Acute hypoxemic respiratory failure   # Right sided pleural effusion  - currently on 2.5L NC  - pulmonology consulted, however due to platelet being low, holding off on thoracentesis  - will have IR drain tomorrow;     # Anemia of chronic disease  # Thrombocytopenia  - monitor    # Coagulopathy due to liver disease  - vitamin K for 3 days  - DIC labs     # Moderate protein tati malnutrition  - PAB and novasource      Diet:  Renal   GI PPx:    DVT PPx:     Goals of Care:  full    High Risk Conditions:  Respiratory failure    Disposition:  Transfer back to [facility] possibly tomorrow; not a transfer candidate currently;     Steve Vang MD  Medical Director [facility]  Spectra:  [phone]  Pager: [phone]

## 2019-10-24 NOTE — PLAN OF CARE
Problem: Physical Therapy Goal  Goal: Physical Therapy Goal  Description  Goals to be met by: 2019     Patient will increase functional independence with mobility by performin. Supine to sit with MInimal Assistance  2. Sit to supine with MInimal Assistance  3. Sit to stand transfer with Stand-by Assistance  4. Gait  x 50 feet with Stand-by Assistance using LRAD.   5. Ascend/descend 4 stair with right Handrails Minimal Assistance.      Outcome: Ongoing, Progressing     Eval completed and POC established.   Parris Flores, PT  10/24/2019

## 2019-10-24 NOTE — PROGRESS NOTES
MELD-Na score: 37 at 10/23/2019  5:06 AM  MELD score: 37 at 10/23/2019  5:06 AM  Calculated from:  Serum Creatinine: 2.8 mg/dL at 10/23/2019  5:06 AM  Serum Sodium: 136 mmol/L at 10/23/2019  5:06 AM  Total Bilirubin: 24.0 mg/dL at 10/23/2019  5:06 AM  INR(ratio): 2.2 at 10/23/2019  5:06 AM  Age: 54 years    - patient admitted over night with decompensated alcohol cirrhosis and HRS now on HD.  Patient is AAO times 4.  Currently on 2.5L NC with CXR showing large right sided pleural effusion, will have pulmonology evaluate that and plan for a thoracentesis; patient states he is urinating, will start lasix IV 80 mg TID; nephrology on board;  - addiction psych and social work evaluating patient to see if patient would be deemed a good transplant candidate as patient recently relapsed with alcohol 1 month ago, PETH pending; hepatology on board;

## 2019-10-24 NOTE — PLAN OF CARE
Problem: Occupational Therapy Goal  Goal: Occupational Therapy Goal  Description  Goals to be met by: 10/31/19    Patient will increase functional independence with ADLs by performing:    UE Dressing with Stand-by Assistance.  LE Dressing with Moderate Assistance.  Grooming while standing at sink with Contact Guard Assistance without seated rest break required.  Toileting from beside commode with Moderate Assistance for hygiene and clothing management.   Supine to sit with Minimal Assistance.  Toilet transfer to bedside commode with Minimal Assistance.  Pt attend to 5 minute functional tasks without cues for re-direction.       Outcome: Ongoing, Progressing   Evaluation completed. Initiate POC.   Rola Gan OT  10/24/2019

## 2019-10-24 NOTE — ASSESSMENT & PLAN NOTE
Patient with Servando over CKD3 (baseline sCr 1.6-1.8) uncertain at this time, as patient was transferred from previous hospital where he received RRt at previous hospital, maybe HRS or iATN in setting of aggressive diurese and prolonged prerenal state with decreased effective circulatory volume.    Plan:  - Will perform HD session today essentially for metabolic clearance and volume management  - Will need to continue monitoring for any signs of improved kidney function  - Avoid nephrotoxic meds, NSAIDs, IV contrast, etc  - MAP > 65  - Will follow closely

## 2019-10-24 NOTE — PROGRESS NOTES
Dr Rosales notified low BP with dialysis. Orders given for UF profile 4, 35.5 dialysate temp and 3 Ca bath. Unable to remove fluid with dialysis. Pt w/o c/o at present. Alert and answers questions appropriate.

## 2019-10-24 NOTE — PHARMACY MED REC
"Admission Medication Reconciliation - Pharmacy Consult Note    The home medication history was taken by Nohemi Trimble, Pharmacy Tech.     You may go to "Admission" then "Reconcile Home Medications" tabs to review and/or act upon these items.      No issues noted with the medication reconciliation.      Makayla Cardona, PharmD, BCPS  [id number]                .    .          "

## 2019-10-24 NOTE — NURSING
To dialysis via stretcher with Oxygen 2L n/c. AAOX4 period of forgetfulness. VVS. No complaint voiced. NAD

## 2019-10-24 NOTE — PROGRESS NOTES
[facility]  Nephrology  Progress Note    Patient Name: Buck Gann  MRN: [id number]  Admission Date: 10/23/2019  Hospital Length of Stay: 1 days  Attending Provider: Steve Vang MD   Primary Care Physician: Connie Sahni MD  Principal Problem:<principal problem not specified>    Subjective:     HPI: 53 y/o White man with alcoholic cirrhosis, CKD stage 3a (baseline sCr ~1.6), admitted on 10/23/2019 after presented as transfer from [facility] for management of decompensated cirrhosis and oliguria.   [facility] patient admitted there 10/16 after he had developed 3 weeks of progressively worsening dyspnea, abdominal distension, and fatigue.   At OSH required ICU care, BiPAP, and RRt as patient's sCr had gone up to 3 from 1.6, oliguric, difficulty diuresing, worsening respiratory status.  After aggressive UF, was able to be weaned down oxygen req to 2-3L NC.  Transferred to our institution for liver txp workup.      Nephrology consulted for evaluation of Servando requiring RRT at outside hospital.      Interval History: Patient evaluated bedside, stable vital signs.  Night uneventful.  Minimal urine output.    Review of patient's allergies indicates:  No Known Allergies  Current Facility-Administered Medications   Medication Frequency    0.9%  NaCl infusion Once    dextrose 10% (D10W) Bolus PRN    dextrose 10% (D10W) Bolus PRN    gentamicin injection 80 mg PRN    glucagon (human recombinant) injection 1 mg PRN    glucose chewable tablet 16 g PRN    glucose chewable tablet 24 g PRN    lidocaine 5 % patch 1 patch Q24H    midodrine tablet 15 mg TID    pantoprazole EC tablet 20 mg Daily    phytonadione vitamin k (AQUA-MEPHYTON) 10 mg in dextrose 5 % 50 mL IVPB Daily    polyethylene glycol packet 17 g TID PRN    sodium chloride 0.9% flush 10 mL PRN    traMADol tablet 50 mg Q6H PRN       Objective:     Vital Signs (Most Recent):  Temp: 97.5 °F (36.4 °C) (10/24/19 1041)  Pulse:  80 (10/24/19 1041)  Resp: 18 (10/24/19 1041)  BP: (!) 104/51 (10/24/19 1041)  SpO2: 95 % (10/24/19 1041)  O2 Device (Oxygen Therapy): nasal cannula w/ humidification (10/24/19 0950) Vital Signs (24h Range):  Temp:  [97.2 °F (36.2 °C)-98 °F (36.7 °C)] 97.5 °F (36.4 °C)  Pulse:  [80-96] 80  Resp:  [17-20] 18  SpO2:  [92 %-97 %] 95 %  BP: (100-111)/(50-63) 104/51     Weight: 110.2 kg (242 lb 15.2 oz) (10/24/19 0531)  Body mass index is 35.88 kg/m².  Body surface area is 2.32 meters squared.    I/O last 3 completed shifts:  In: 540 [P.O.:540]  Out: 70 [Urine:70]    Physical Exam   Constitutional: He is oriented to person, place, and time. He appears well-developed. He is cooperative. He has a sickly appearance. Nasal cannula in place.   obese   HENT:   Head: Normocephalic and atraumatic.   Mouth/Throat: Oropharynx is clear and moist.   Eyes: Pupils are equal, round, and reactive to light. EOM are normal. Scleral icterus is present.   Neck: Normal range of motion. Neck supple.   Cardiovascular: Regular rhythm and normal heart sounds.   Pulmonary/Chest: Effort normal. He has rales in the right lower field and the left lower field.   Decreased breath sounds in right lung field   Abdominal: Soft. Normal appearance and bowel sounds are normal. There is generalized tenderness. There is no rigidity, no rebound and no guarding.   Musculoskeletal: Normal range of motion. He exhibits edema. He exhibits no tenderness.   Lymphadenopathy:     He has no cervical adenopathy.   Neurological: He is alert and oriented to person, place, and time. No cranial nerve deficit.   Skin: Skin is warm and dry.   Nursing note and vitals reviewed.      Significant Labs:  CBC:   Recent Labs   Lab 10/24/19  0502   WBC 11.79   RBC 2.52*   HGB 8.6*   HCT 26.3*   PLT 37*   *   MCH 34.1*   MCHC 32.7     CMP:   Recent Labs   Lab 10/24/19  0502   GLU 90   CALCIUM 8.6*   ALBUMIN 2.4*   PROT 5.1*   *   K 4.2   CO2 21*      BUN 27*    CREATININE 3.4*   ALKPHOS 126   ALT 12   AST 54*   BILITOT 22.0*     All labs within the past 24 hours have been reviewed.     Significant Imaging:  CXR personally reviewed.    Assessment/Plan:     SERVANDO (acute kidney injury)  Patient with Servando over CKD3 (baseline sCr 1.6-1.8) uncertain at this time, as patient was transferred from previous hospital where he received RRt at previous hospital, maybe HRS or iATN in setting of aggressive diurese and prolonged prerenal state with decreased effective circulatory volume.    Plan:  - Will perform HD session today essentially for metabolic clearance and volume management  - Will need to continue monitoring for any signs of improved kidney function  - Avoid nephrotoxic meds, NSAIDs, IV contrast, etc  - MAP > 65  - Will follow closely    Alcoholic cirrhosis of liver with ascites  Per primary team        Thank you for your consult. I will follow-up with patient. Please contact us if you have any additional questions.    Ernesto Stafford MD  Nephrology  [facility]    ATTENDING PHYSICIAN ATTESTATION  I have personally verified the history and examined the patient. I thoroughly reviewed the demographic, clinical, laboratorial and imaging information available in medical records. I agree with the assessment and recommendations provided by the subspecialty resident who was under my supervision.

## 2019-10-24 NOTE — PROGRESS NOTES
Tx complete. BP low during Tx. No fluid removed. Lt chest CVC lumens flushed with NS, locked with gentamicin, capped and secured.

## 2019-10-24 NOTE — PT/OT/SLP EVAL
Occupational Therapy   Evaluation    Name: Buck Gann  MRN: [id number]  Admitting Diagnosis: Alcoholic Cirrhosis     Recommendations:     Discharge Recommendations: rehabilitation facility  Discharge Equipment Recommendations:  (TBD)  Barriers to discharge:  None    Assessment:     Buck Gann is a 54 y.o. male with a medical diagnosis of Alcoholic Cirrhoisis.  He presents alert and willing to participate in therapy session. Upon arrival, pt found with no family present. Pt demo cognitive deficits with attention,problem solving,  sequencing, safety awareness, and overall decline in PLOF with cognitive task. Pt displays decline in occupational performance with functional task with decrease in ability to carry out appropriate interactions, sequencing of ADLs/self care and performance of prior tasks.  Performance deficits affecting function: weakness, impaired endurance, gait instability, impaired balance, impaired self care skills, impaired functional mobilty, impaired cognition, decreased safety awareness, impaired cardiopulmonary response to activity, pain. At this time, pt is a high fall risk and requires increased level of skilled assistance with ADL and functional mobility. He would benefit from rehab following d/c to continue to progress towards goals and improve quality of life.     Rehab Prognosis: Good; patient would benefit from acute skilled OT services to address these deficits and reach maximum level of function.       Plan:     Patient to be seen 4 x/week to address the above listed problems via self-care/home management, therapeutic exercises, therapeutic activities, neuromuscular re-education  · Plan of Care Expires: 11/22/19  · Plan of Care Reviewed with: patient    Subjective     Chief Complaint: Pain, nausea   Patient/Family Comments/goals: Return home    Occupational Profile:  Living Environment: Pt lives in trailer alone; 4STE with right railing; bathroom contains tub-shower combo with no  DME  Previous level of function: PTA, pt reports furniture walking at home and using straight cane during community mobility and independent with ADL  Roles and Routines: Home/community dweller, does not work  Equipment Used at Home:  cane, straight  Assistance upon Discharge: Pt will have assistance from family    Pain/Comfort:  · Pain Rating 1: (abdominal pain; nausea)    Patients cultural, spiritual, Denominational conflicts given the current situation: no    Objective:     Communicated with: RN prior to session.  Patient found supine with telemetry, oxygen, peripheral IV, pressure relief boots, bed alarm upon OT entry to room.    General Precautions: Standard, fall   Orthopedic Precautions:N/A   Braces: N/A     Occupational Performance:    Bed Mobility:    · Patient completed Rolling/Turning to Right with moderate assistance  · Patient completed Supine to Sit with moderate assistance  · Patient completed Sit to Supine with moderate assistance    Sitting EOB:   Pt sat EOB ~18 minutes with SBA for postural control    Functional Mobility/Transfers:  · Patient completed Sit <> Stand Transfer with moderate assistance  with  rolling walker   - x1 trial from EOB; pt refused further trials  Unable to complete functional transfer to chair/mobility 2/2 nausea     Activities of Daily Living:  · Upper Body Dressing: moderate assistance to donng gown like jacket while seated EOB  · Lower Body Dressing: maximal assistance to jose juan B socks while supine    Cognitive/Visual Perceptual:  Cognitive/Psychosocial Skills:     -       Oriented to: Person, Place, Time and Situation   -       Follows Commands/attention:Inattentive, Easily distracted and Follows one-step commands  -       Communication: clear/fluent  -       Safety awareness/insight to disability: impaired   -       Mood/Affect/Coping skills/emotional control: Pleasant  Visual/Perceptual:      -Intact     Physical Exam:  Postural examination/scapula alignment:    -        Rounded shoulders  Skin integrity: Visible skin intact  Edema:  None noted  Dominant hand:    -       RUE  Upper Extremity Range of Motion:     -       Right Upper Extremity: WFL  -       Left Upper Extremity: WFL  Upper Extremity Strength:    -       Right Upper Extremity: WFL 4/5 shoulder flexion; 4+/5 elbow flexion  -       Left Upper Extremity: WFL  4/5 shoulder flexion; 4+/5 elbow flexion   Strength:    -       Right Upper Extremity: WFL  -       Left Upper Extremity: WFL    AMPAC 6 Click ADL:  AMPAC Total Score: 16    Treatment & Education:  -Pt edu on OT role/POC  -Importance of OOB activity with staff assistance   -Safety during functional transfers ( proper hand placement)  - Edu pt on positioning in bed for pressure relief -- pt verbalized understanding  -White board updated  - Multiple self care tasks completed--as noted above  - All questions/concerns answered within OT scope of practice  Education:    Patient left supine with all lines intact, call button in reach and RN notified    GOALS:   Multidisciplinary Problems     Occupational Therapy Goals        Problem: Occupational Therapy Goal    Goal Priority Disciplines Outcome Interventions   Occupational Therapy Goal     OT, PT/OT Ongoing, Progressing    Description:  Goals to be met by: 10/31/19    Patient will increase functional independence with ADLs by performing:    UE Dressing with Stand-by Assistance.  LE Dressing with Moderate Assistance.  Grooming while standing at sink with Contact Guard Assistance without seated rest break required.  Toileting from beside commode with Moderate Assistance for hygiene and clothing management.   Supine to sit with Minimal Assistance.  Toilet transfer to bedside commode with Minimal Assistance.  Pt attend to 5 minute functional tasks without cues for re-direction.                        History:     Past Medical History:   Diagnosis Date    Alcohol abuse     Cirrhosis     Pancreatitis        Past Surgical  History:   Procedure Laterality Date    GASTRIC BYPASS      [facility]       Time Tracking:     OT Date of Treatment: 10/24/19  OT Start Time: 0958  OT Stop Time: 1034  OT Total Time (min): 36 min    Billable Minutes:Evaluation 26  Therapeutic Activity 10    Rola Gan OT  10/24/2019

## 2019-10-24 NOTE — HPI
54M with alcoholic cirrhosis presents as transfer from [facility] for management of decompensated cirrhosis and oliguria.  He was admitted there 10/16 with 3 weeks of progressively worsening dyspnea, abdominal distension, and fatigue.  He was admitted to the ICU in respiratory distress and put on BiPAP.  He also had DEON with his creatinine up to 3 from 1.6 and apparently wasn't responding sufficiently to diuresis so within a day or so a dialysis line was placed and dialysis was begun for volume removal, after which his respiratory status began to improve.  He was able to wean down to 2-3 L NC O2.  His urine output has been scant, however.  A dinh was placed which has had a small amount of muddy colored output.  He had a 2L paracentesis during this hospitalization as well.  He has been having some persistent back and abdominal pain while in the hospital for which he was receiving morphine.  He feels considerably better than he did a week ago.    He follows with Dr Smith as an outpatient and was referred for outpatient transplant workup a few months ago but due to financial concerns has so far not made it over here to begin the evaluation.  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  He admits to some occasional alcohol use over the past several months.

## 2019-10-24 NOTE — SUBJECTIVE & OBJECTIVE
Interval History: Patient evaluated bedside, stable vital signs.  Night uneventful.  Minimal urine output.    Review of patient's allergies indicates:  No Known Allergies  Current Facility-Administered Medications   Medication Frequency    0.9%  NaCl infusion Once    dextrose 10% (D10W) Bolus PRN    dextrose 10% (D10W) Bolus PRN    gentamicin injection 80 mg PRN    glucagon (human recombinant) injection 1 mg PRN    glucose chewable tablet 16 g PRN    glucose chewable tablet 24 g PRN    lidocaine 5 % patch 1 patch Q24H    midodrine tablet 15 mg TID    pantoprazole EC tablet 20 mg Daily    phytonadione vitamin k (AQUA-MEPHYTON) 10 mg in dextrose 5 % 50 mL IVPB Daily    polyethylene glycol packet 17 g TID PRN    sodium chloride 0.9% flush 10 mL PRN    traMADol tablet 50 mg Q6H PRN       Objective:     Vital Signs (Most Recent):  Temp: 97.5 °F (36.4 °C) (10/24/19 1041)  Pulse: 80 (10/24/19 1041)  Resp: 18 (10/24/19 1041)  BP: (!) 104/51 (10/24/19 1041)  SpO2: 95 % (10/24/19 1041)  O2 Device (Oxygen Therapy): nasal cannula w/ humidification (10/24/19 0950) Vital Signs (24h Range):  Temp:  [97.2 °F (36.2 °C)-98 °F (36.7 °C)] 97.5 °F (36.4 °C)  Pulse:  [80-96] 80  Resp:  [17-20] 18  SpO2:  [92 %-97 %] 95 %  BP: (100-111)/(50-63) 104/51     Weight: 110.2 kg (242 lb 15.2 oz) (10/24/19 0531)  Body mass index is 35.88 kg/m².  Body surface area is 2.32 meters squared.    I/O last 3 completed shifts:  In: 540 [P.O.:540]  Out: 70 [Urine:70]    Physical Exam   Constitutional: He is oriented to person, place, and time. He appears well-developed. He is cooperative. He has a sickly appearance. Nasal cannula in place.   obese   HENT:   Head: Normocephalic and atraumatic.   Mouth/Throat: Oropharynx is clear and moist.   Eyes: Pupils are equal, round, and reactive to light. EOM are normal. Scleral icterus is present.   Neck: Normal range of motion. Neck supple.   Cardiovascular: Regular rhythm and normal heart sounds.    Pulmonary/Chest: Effort normal. He has rales in the right lower field and the left lower field.   Decreased breath sounds in right lung field   Abdominal: Soft. Normal appearance and bowel sounds are normal. There is generalized tenderness. There is no rigidity, no rebound and no guarding.   Musculoskeletal: Normal range of motion. He exhibits edema. He exhibits no tenderness.   Lymphadenopathy:     He has no cervical adenopathy.   Neurological: He is alert and oriented to person, place, and time. No cranial nerve deficit.   Skin: Skin is warm and dry.   Nursing note and vitals reviewed.      Significant Labs:  CBC:   Recent Labs   Lab 10/24/19  0502   WBC 11.79   RBC 2.52*   HGB 8.6*   HCT 26.3*   PLT 37*   *   MCH 34.1*   MCHC 32.7     CMP:   Recent Labs   Lab 10/24/19  0502   GLU 90   CALCIUM 8.6*   ALBUMIN 2.4*   PROT 5.1*   *   K 4.2   CO2 21*      BUN 27*   CREATININE 3.4*   ALKPHOS 126   ALT 12   AST 54*   BILITOT 22.0*     All labs within the past 24 hours have been reviewed.     Significant Imaging:  CXR personally reviewed.

## 2019-10-24 NOTE — PLAN OF CARE
POC reviewed with patient. Address questions and concerns. AAOX4. Patient returned from HD unable to remove fluid for 3 hr.  Blood pressure low  during tx.  MINOO Nino with abdullahi. Oxygen at 2L. Possible thoracentesis if platelet >50 or transfer back [facility]. Safety maintained. Free from fall. Call light in reach. Bed alarm activated. [facility]

## 2019-10-25 VITALS
DIASTOLIC BLOOD PRESSURE: 52 MMHG | WEIGHT: 243.81 LBS | HEIGHT: 69 IN | OXYGEN SATURATION: 93 % | BODY MASS INDEX: 36.11 KG/M2 | SYSTOLIC BLOOD PRESSURE: 94 MMHG | HEART RATE: 84 BPM | TEMPERATURE: 98 F | RESPIRATION RATE: 18 BRPM

## 2019-10-25 LAB
ALBUMIN SERPL BCP-MCNC: 2.4 G/DL (ref 3.5–5.2)
ALP SERPL-CCNC: 144 U/L (ref 55–135)
ALT SERPL W/O P-5'-P-CCNC: 12 U/L (ref 10–44)
ANION GAP SERPL CALC-SCNC: 7 MMOL/L (ref 8–16)
APPEARANCE FLD: CLEAR
AST SERPL-CCNC: 61 U/L (ref 10–40)
BASOPHILS # BLD AUTO: 0.07 K/UL (ref 0–0.2)
BASOPHILS NFR BLD: 0.5 % (ref 0–1.9)
BILIRUB SERPL-MCNC: 22.1 MG/DL (ref 0.1–1)
BODY FLD TYPE: NORMAL
BUN SERPL-MCNC: 19 MG/DL (ref 6–20)
CALCIUM SERPL-MCNC: 8.6 MG/DL (ref 8.7–10.5)
CHLORIDE SERPL-SCNC: 103 MMOL/L (ref 95–110)
CO2 SERPL-SCNC: 25 MMOL/L (ref 23–29)
COLOR FLD: NORMAL
CREAT SERPL-MCNC: 2.9 MG/DL (ref 0.5–1.4)
DIFFERENTIAL METHOD: ABNORMAL
EOSINOPHIL # BLD AUTO: 0.5 K/UL (ref 0–0.5)
EOSINOPHIL NFR BLD: 4.2 % (ref 0–8)
ERYTHROCYTE [DISTWIDTH] IN BLOOD BY AUTOMATED COUNT: 23.2 % (ref 11.5–14.5)
EST. GFR  (AFRICAN AMERICAN): 26.9 ML/MIN/1.73 M^2
EST. GFR  (NON AFRICAN AMERICAN): 23.3 ML/MIN/1.73 M^2
GLUCOSE SERPL-MCNC: 81 MG/DL (ref 70–110)
HCT VFR BLD AUTO: 27.3 % (ref 40–54)
HGB BLD-MCNC: 9.1 G/DL (ref 14–18)
IMM GRANULOCYTES # BLD AUTO: 0.07 K/UL (ref 0–0.04)
IMM GRANULOCYTES NFR BLD AUTO: 0.5 % (ref 0–0.5)
INR PPP: 2.1 (ref 0.8–1.2)
LYMPHOCYTES # BLD AUTO: 1.3 K/UL (ref 1–4.8)
LYMPHOCYTES NFR BLD: 10.4 % (ref 18–48)
LYMPHOCYTES NFR FLD MANUAL: 35 %
MAGNESIUM SERPL-MCNC: 1.9 MG/DL (ref 1.6–2.6)
MCH RBC QN AUTO: 34 PG (ref 27–31)
MCHC RBC AUTO-ENTMCNC: 33.3 G/DL (ref 32–36)
MCV RBC AUTO: 102 FL (ref 82–98)
MESOTHL CELL NFR FLD MANUAL: 9 %
MONOCYTES # BLD AUTO: 1.1 K/UL (ref 0.3–1)
MONOCYTES NFR BLD: 8.3 % (ref 4–15)
MONOS+MACROS NFR FLD MANUAL: 43 %
NEUTROPHILS # BLD AUTO: 9.8 K/UL (ref 1.8–7.7)
NEUTROPHILS NFR BLD: 76.1 % (ref 38–73)
NEUTROPHILS NFR FLD MANUAL: 13 %
NRBC BLD-RTO: 0 /100 WBC
PHOSPHATE SERPL-MCNC: 3 MG/DL (ref 2.7–4.5)
PLATELET # BLD AUTO: 40 K/UL (ref 150–350)
PMV BLD AUTO: 12.7 FL (ref 9.2–12.9)
POTASSIUM SERPL-SCNC: 3.9 MMOL/L (ref 3.5–5.1)
PROT SERPL-MCNC: 5.3 G/DL (ref 6–8.4)
PROTHROMBIN TIME: 20.8 SEC (ref 9–12.5)
RBC # BLD AUTO: 2.68 M/UL (ref 4.6–6.2)
SODIUM SERPL-SCNC: 135 MMOL/L (ref 136–145)
WBC # BLD AUTO: 12.94 K/UL (ref 3.9–12.7)
WBC # FLD: 58 /CU MM

## 2019-10-25 PROCEDURE — 99239 PR HOSPITAL DISCHARGE DAY,>30 MIN: ICD-10-PCS | Mod: ,,, | Performed by: HOSPITALIST

## 2019-10-25 PROCEDURE — 85025 COMPLETE CBC W/AUTO DIFF WBC: CPT

## 2019-10-25 PROCEDURE — 89051 BODY FLUID CELL COUNT: CPT

## 2019-10-25 PROCEDURE — 84100 ASSAY OF PHOSPHORUS: CPT

## 2019-10-25 PROCEDURE — 63600175 PHARM REV CODE 636 W HCPCS: Performed by: HOSPITALIST

## 2019-10-25 PROCEDURE — 25000003 PHARM REV CODE 250: Performed by: HOSPITALIST

## 2019-10-25 PROCEDURE — 85610 PROTHROMBIN TIME: CPT

## 2019-10-25 PROCEDURE — 99233 SBSQ HOSP IP/OBS HIGH 50: CPT | Mod: ,,, | Performed by: INTERNAL MEDICINE

## 2019-10-25 PROCEDURE — 97530 THERAPEUTIC ACTIVITIES: CPT

## 2019-10-25 PROCEDURE — 83735 ASSAY OF MAGNESIUM: CPT

## 2019-10-25 PROCEDURE — 99233 PR SUBSEQUENT HOSPITAL CARE,LEVL III: ICD-10-PCS | Mod: ,,, | Performed by: INTERNAL MEDICINE

## 2019-10-25 PROCEDURE — 36415 COLL VENOUS BLD VENIPUNCTURE: CPT

## 2019-10-25 PROCEDURE — 99239 HOSP IP/OBS DSCHRG MGMT >30: CPT | Mod: ,,, | Performed by: HOSPITALIST

## 2019-10-25 PROCEDURE — 80053 COMPREHEN METABOLIC PANEL: CPT

## 2019-10-25 PROCEDURE — 20600001 HC STEP DOWN PRIVATE ROOM

## 2019-10-25 RX ORDER — LIDOCAINE 50 MG/G
1 PATCH TOPICAL DAILY
Refills: 0
Start: 2019-10-25

## 2019-10-25 RX ORDER — MIDODRINE HYDROCHLORIDE 5 MG/1
15 TABLET ORAL 3 TIMES DAILY
Qty: 270 TABLET | Refills: 11
Start: 2019-10-25 | End: 2020-10-24

## 2019-10-25 RX ADMIN — PHYTONADIONE 10 MG: 10 INJECTION, EMULSION INTRAMUSCULAR; INTRAVENOUS; SUBCUTANEOUS at 08:10

## 2019-10-25 RX ADMIN — MIDODRINE HYDROCHLORIDE 15 MG: 5 TABLET ORAL at 08:10

## 2019-10-25 RX ADMIN — MIDODRINE HYDROCHLORIDE 15 MG: 5 TABLET ORAL at 07:10

## 2019-10-25 RX ADMIN — SODIUM CHLORIDE 1000 ML: 0.9 INJECTION, SOLUTION INTRAVENOUS at 11:10

## 2019-10-25 RX ADMIN — MIDODRINE HYDROCHLORIDE 15 MG: 5 TABLET ORAL at 02:10

## 2019-10-25 RX ADMIN — TRAMADOL HYDROCHLORIDE 50 MG: 50 TABLET ORAL at 07:10

## 2019-10-25 RX ADMIN — TRAMADOL HYDROCHLORIDE 50 MG: 50 TABLET ORAL at 09:10

## 2019-10-25 RX ADMIN — PANTOPRAZOLE SODIUM 20 MG: 20 TABLET, DELAYED RELEASE ORAL at 08:10

## 2019-10-25 RX ADMIN — LIDOCAINE 1 PATCH: 50 PATCH TOPICAL at 12:10

## 2019-10-25 NOTE — PLAN OF CARE
Transfer center called last night at 2146 stating patient had a bed available at [facility].  This RN stated the MD would have to be notified since patient had orders to have a paracentesis, thoracentesis, and peritoneal drain placed in AM.  Transfer center said to hold they would speak to [place] to see if the bed could be held.  Transfer center called back at 2151 stating they would try to hold bed and that the day nurse was to call transfer center as soon as patient was done with procedures.      VSS. A/Ox3 can be forgetful at times.  No complaints of pain.  Patient oliguric.  No acute events overnight. Safety maintained.  All questions answered. Patient updated on plan of care.  Will continue to monitor.

## 2019-10-25 NOTE — PLAN OF CARE
Call received from [facility] ([id number]) inquiring if patient has had his thoracentesis and his paracentesis. A call was then placed to patient's nurse who informed this CM that Mr. Gann has not gone for his procedures yet. [facility] informed of above conversation. Nurse to call [facility] when patient's procedures are complete to update Patient Flow Center.    Vanesa Lynne RN  Ext [phone]

## 2019-10-25 NOTE — H&P
Inpatient Radiology Pre-procedure Note    History of Present Illness:  Buck Gann is a 54 y.o. male who presents for paracentesis and thoracentesis.    Admission H&P reviewed.  Past Medical History:   Diagnosis Date    Alcohol abuse     Cirrhosis     Pancreatitis      Past Surgical History:   Procedure Laterality Date    GASTRIC BYPASS      [facility]       Review of Systems:   As documented in primary team H&P    Home Meds:   Prior to Admission medications    Medication Sig Start Date End Date Taking? Authorizing Provider   pantoprazole (PROTONIX) 40 MG tablet Take 40 mg by mouth once daily.    Yes Historical Provider, MD   furosemide (LASIX) 40 MG tablet Take 1 tablet (40 mg total) by mouth once daily. 7/5/19 10/25/19 Yes Maryan Smith MD   VIRT-CAPS 1 mg Cap Take 1 mg by mouth once daily. 3/12/19 10/25/19 Yes Historical Provider, MD   lidocaine (LIDODERM) 5 % Place 1 patch onto the skin once daily. Remove & Discard patch within 12 hours or as directed by MD 10/25/19   Steve Vang MD   midodrine (PROAMATINE) 5 MG Tab Take 3 tablets (15 mg total) by mouth 3 (three) times daily. 10/25/19 10/24/20  Steve Vang MD     Scheduled Meds:    lidocaine  1 patch Transdermal Q24H    midodrine  15 mg Oral TID    pantoprazole  20 mg Oral Daily     Continuous Infusions:   PRN Meds:Dextrose 10% Bolus, Dextrose 10% Bolus, gentamicin, glucagon (human recombinant), glucose, glucose, polyethylene glycol, sodium chloride 0.9%, traMADol  Anticoagulants/Antiplatelets: no anticoagulation    Allergies: Review of patient's allergies indicates:  No Known Allergies  Sedation Hx: have not been any systemic reactions    Labs:  Recent Labs   Lab 10/25/19  0519   INR 2.1*       Recent Labs   Lab 10/25/19  0519   WBC 12.94*   HGB 9.1*   HCT 27.3*   *   PLT 40*      Recent Labs   Lab 10/25/19  0519   GLU 81   *   K 3.9      CO2 25   BUN 19   CREATININE 2.9*   CALCIUM 8.6*   MG 1.9   ALT 12   AST 61*   ALBUMIN 2.4*    BILITOT 22.1*         Vitals:  Temp: 97.5 °F (36.4 °C) (10/25/19 1519)  Pulse: 78 (10/25/19 1519)  Resp: 17 (10/25/19 1519)  BP: (!) 100/48 (10/25/19 1519)  SpO2: (!) 94 % (10/25/19 1519)     Physical Exam:  ASA: 3  Mallampati: 2    General: no acute distress  Mental Status: alert and oriented to person, place and time  HEENT: normocephalic, atraumatic  Chest: unlabored breathing  Heart: no heaves  Abdomen: distended  Extremity: moves all extremities    Plan: paracentesis and thoracentesis  Sedation Plan: none    Mil Grayson MD  Radiology

## 2019-10-25 NOTE — PLAN OF CARE
Thoracentesis and paracentesis performed. Pt tolerated both procedures well. Sites bandaged, CDI. Labs collected and sent. Report to Primary Nurse Pearl given by Doron Lee by Dr Calvin to have XRAY in his room. Pearl made aware.

## 2019-10-25 NOTE — PLAN OF CARE
Problem: Physical Therapy Goal  Goal: Physical Therapy Goal  Description  Goals to be met by: 2019     Patient will increase functional independence with mobility by performin. Supine to sit with MInimal Assistance  2. Sit to supine with MInimal Assistance  3. Sit to stand transfer with Stand-by Assistance  4. Gait  x 50 feet with Stand-by Assistance using LRAD.   5. Ascend/descend 4 stair with right Handrails Minimal Assistance.      Outcome: Ongoing, Progressing     Pt is progressing towards goals.   Parris Flores, PT  10/25/2019

## 2019-10-25 NOTE — CONSULTS
Palliative Care Consult.    Consult received and discussed with Dr. Vang.  [facility] Thank you for the consult and please re-consult if we can be of further assistance.    Karena FONTAINE, ACNS-BC, ACHPN

## 2019-10-25 NOTE — PLAN OF CARE
Pt arrives to IR via stretcher. Pt is AAOx4, able to state reason he is here. NAD noted or reported. Orders, hx, labs, consent reviewed.

## 2019-10-25 NOTE — PT/OT/SLP PROGRESS
Occupational Therapy   Treatment    Name: Buck Gann  MRN: [id number]  Admitting Diagnosis:  Alcoholic cirrhosis of liver with ascites       Recommendations:     Discharge Recommendations: rehabilitation facility  Discharge Equipment Recommendations:  (TBD)  Barriers to discharge:  None    Assessment:     Buck Gann is a 54 y.o. male with a medical diagnosis of Alcoholic cirrhosis of liver with ascites.  He presents alert and willing to participate in therapy session. Upon arrival, pt found seated supine with no family present. Pt displays poor attention, word finding, and poor multi-steps commands. Performance deficits affecting function are weakness, impaired endurance, impaired self care skills, impaired functional mobilty, gait instability, impaired balance, impaired cardiopulmonary response to activity, pain, decreased safety awareness, impaired cognition. Pt would benefit from rehab following d/c to continue to progress towards goals and improve quality of life.     Rehab Prognosis:  Good; patient would benefit from acute skilled OT services to address these deficits and reach maximum level of function.       Plan:     Patient to be seen 4 x/week to address the above listed problems via self-care/home management, therapeutic activities, therapeutic exercises  · Plan of Care Expires: 11/22/19  · Plan of Care Reviewed with: patient    Subjective     Pain/Comfort:  · Pain Rating 1: 0/10  · Pain Rating Post-Intervention 1: 0/10    Objective:     Communicated with: RN prior to session.  Patient found supine with telemetry, oxygen, peripheral IV, bed alarm upon OT entry to room.    General Precautions: Standard, fall   Orthopedic Precautions:N/A   Braces: N/A     Occupational Performance:     Bed Mobility:    · Patient completed Rolling/Turning to Right with minimum assistance  · Patient completed Supine to Sit with moderate assistance    Functional Mobility/Transfers:  · Patient completed Sit <> Stand Transfer  with moderate assistance  with  rolling walker   · Patient completed Bed <> Chair Transfer using Stand Pivot technique with minimum assistance with rolling walker  · Functional Mobility: Pt took ~6 steps from EOB < bedside chair with minimal assistance with RW. He tolerated well with no LOB however displayed increase in WOB.    Sitting EOB:   Pt sat EOB ~8 minutes with SBA-CGA for postural control    Activities of Daily Living:  · Grooming: stand by assistance with set-up assistance to wash face while seated UIC  · Upper Body Dressing: moderate assistance to jose juan gown like jacket while seated EOB    AMPAC 6 Click ADL: 16    Treatment & Education:  -Pt edu on OT role/POC  -Importance of OOB activity with staff assistance ( UIC during each meal)  -Safety during functional t/f and mobility ( Proper hand placement/Rw management)  -White board updated  - Multiple self care tasks completed--as noted above  - All questions/concerns answered within OT scope of practice    Patient left up in chair with all lines intact, call button in reach and RN notifiedEducation:      GOALS:   Multidisciplinary Problems     Occupational Therapy Goals        Problem: Occupational Therapy Goal    Goal Priority Disciplines Outcome Interventions   Occupational Therapy Goal     OT, PT/OT Ongoing, Progressing    Description:  Goals to be met by: 10/31/19    Patient will increase functional independence with ADLs by performing:    UE Dressing with Stand-by Assistance.  LE Dressing with Moderate Assistance.  Grooming while standing at sink with Contact Guard Assistance without seated rest break required.  Toileting from beside commode with Moderate Assistance for hygiene and clothing management.   Supine to sit with Minimal Assistance.  Toilet transfer to bedside commode with Minimal Assistance.  Pt attend to 5 minute functional tasks without cues for re-direction.                        Time Tracking:     OT Date of Treatment: 10/25/19  OT Start  Time: 0818  OT Stop Time: 0836  OT Total Time (min): 18 min    Billable Minutes:Therapeutic Activity 18    Rola Gan OT  10/25/2019

## 2019-10-25 NOTE — NURSING
Returned from IR via stretcher sites bandage to right abdominal and back C/D/I. Call [facility] ([phone]) Inform patient finish with Paracentesis and Thoracentesis. Patient aware that he being transfer back to [facility]. Remain on Oxygen. No complaint of discomfort/pain. WCTM

## 2019-10-25 NOTE — PLAN OF CARE
[facility]    FACILITY TRANSFER ORDERS      Patient Name: Buck Gann  YOB: 1965    PCP: Connie Sahni MD   PCP Address: [address] [place] [address] / [place] [address]  PCP Phone Number: [phone]  PCP Fax: [phone]    Encounter Date: 10/25/2019    Admit to: [facility]     Vital Signs:  Routine    Diagnoses:   Active Hospital Problems    Diagnosis  POA    *Alcoholic cirrhosis of liver with ascites [K70.31]  Yes    Pleural effusion associated with hepatic disorder [K76.9, J91.8]  Yes    ATN (acute tubular necrosis) [N17.0]  Yes    Hypotension [I95.9]  Yes    Coagulopathy [D68.9]  Yes    Anemia of chronic disease [D63.8]  Yes    Thrombocytopenia [D69.6]  Yes    Metabolic acidosis [E87.2]  Yes    Moderate protein-calorie malnutrition [E44.0]  Yes    DEON (acute kidney injury) [N17.9]  Yes    Acute hypoxemic respiratory failure [J96.01]  Yes    Alcohol use disorder, severe, dependence [F10.20]  Yes    Pre-transplant evaluation for liver transplant [Z01.818]  Not Applicable      Resolved Hospital Problems   No resolved problems to display.       Allergies:Review of patient's allergies indicates:  No Known Allergies    Diet: renal diet    Activities: Activity as tolerated    Nursing: routine     Labs: cbc, cmp, inr, mag, valerie daily    CONSULTS:      Nephrology for HD    PT/OT    dietary        Medications: Review discharge medications with patient and family and provide education.      Current Discharge Medication List      START taking these medications    Details   lidocaine (LIDODERM) 5 % Place 1 patch onto the skin once daily. Remove & Discard patch within 12 hours or as directed by MD  Refills: 0      midodrine (PROAMATINE) 5 MG Tab Take 3 tablets (15 mg total) by mouth 3 (three) times daily.  Qty: 270 tablet, Refills: 11         CONTINUE these medications which have NOT CHANGED    Details   pantoprazole (PROTONIX) 40 MG tablet Take 40 mg by mouth  once daily.          STOP taking these medications       furosemide (LASIX) 40 MG tablet Comments:   Reason for Stopping:         VIRT-CAPS 1 mg Cap Comments:   Reason for Stopping:                    _________________________________  Steve Vang MD  10/25/2019

## 2019-10-25 NOTE — PLAN OF CARE
Problem: Occupational Therapy Goal  Goal: Occupational Therapy Goal  Description  Goals to be met by: 10/31/19    Patient will increase functional independence with ADLs by performing:    UE Dressing with Stand-by Assistance.  LE Dressing with Moderate Assistance.  Grooming while standing at sink with Contact Guard Assistance without seated rest break required.  Toileting from beside commode with Moderate Assistance for hygiene and clothing management.   Supine to sit with Minimal Assistance.  Toilet transfer to bedside commode with Minimal Assistance.  Pt attend to 5 minute functional tasks without cues for re-direction.       Outcome: Ongoing, Progressing   Pt progressing well with goals.   Rola Gan OT  10/25/2019

## 2019-10-25 NOTE — PROGRESS NOTES
[facility]  Pulmonology  Progress Note    Patient Name: Buck Gann  MRN: [id number]  Admission Date: 10/23/2019  Hospital Length of Stay: 2 days  Code Status: Full Code  Attending Provider: Steve Vang MD  Primary Care Provider: Connie Sahni MD   Principal Problem: Alcoholic cirrhosis of liver with ascites    Subjective:     Interval History:  inpt pulmonology consult for pleural effusion involving the entire R lung. Pt currently 95% on 2L NC. SOB unchanged from yesterday. In no acute respiratory distress  Platelets and INR unchanged from previous day and therefore thoracentesis will be deferred until levels improved.    Objective:     Vital Signs (Most Recent):  Temp: 97.6 °F (36.4 °C) (10/25/19 0737)  Pulse: 80 (10/25/19 0737)  Resp: 20 (10/25/19 0737)  BP: (!) 103/51 (10/25/19 0737)  SpO2: 95 % (10/25/19 0737) Vital Signs (24h Range):  Temp:  [97.4 °F (36.3 °C)-97.7 °F (36.5 °C)] 97.6 °F (36.4 °C)  Pulse:  [70-94] 80  Resp:  [16-20] 20  SpO2:  [92 %-95 %] 95 %  BP: ()/(28-61) 103/51     Weight: 110.6 kg (243 lb 13.3 oz)  Body mass index is 36.01 kg/m².      Intake/Output Summary (Last 24 hours) at 10/25/2019 0915  Last data filed at 10/24/2019 1800  Gross per 24 hour   Intake 690 ml   Output 145 ml   Net 545 ml       Physical Exam   Constitutional: He is oriented to person, place, and time. He appears well-developed and well-nourished.   Yellow discoloration of skin    HENT:   Head: Normocephalic and atraumatic.   Eyes: Pupils are equal, round, and reactive to light. EOM are normal. Scleral icterus is present.   Neck: Normal range of motion. Neck supple. No JVD present. No tracheal deviation present.   Cardiovascular: Normal rate and regular rhythm. Exam reveals no gallop and no friction rub.   Murmur heard.  2/6 holosystolic murmur more pronounced over the RUSB   Pulmonary/Chest: Effort normal and breath sounds normal. No accessory muscle usage. No respiratory distress. He has  no wheezes. He has no rales. He exhibits no tenderness.   Decrease breath sound over upper, mid, and lower R lung fields   Abdominal: Bowel sounds are normal. He exhibits distension. There is no tenderness. There is no rebound and no guarding.   Musculoskeletal: Normal range of motion.   Neurological: He is alert and oriented to person, place, and time.       Skin: Skin is warm and dry.   Multiple scattered areas of purpura and petechiae over upper chest, abdomen and upper extremities    Nursing note and vitals reviewed.      Vents:       Lines/Drains/Airways     Peripheral Intravenous Line                 Peripheral IV - Single Lumen 10/23/19 1530 20 G;1 3/4 in Left Forearm 1 day                Significant Labs:    CBC/Anemia Profile:  Recent Labs   Lab 10/24/19  0502 10/25/19  0519   WBC 11.79 12.94*   HGB 8.6* 9.1*   HCT 26.3* 27.3*   PLT 37* 40*   * 102*   RDW 24.0* 23.2*        Chemistries:  Recent Labs   Lab 10/24/19  0502 10/25/19  0519   * 135*   K 4.2 3.9    103   CO2 21* 25   BUN 27* 19   CREATININE 3.4* 2.9*   CALCIUM 8.6* 8.6*   ALBUMIN 2.4* 2.4*   PROT 5.1* 5.3*   BILITOT 22.0* 22.1*   ALKPHOS 126 144*   ALT 12 12   AST 54* 61*   MG 1.9 1.9   PHOS 3.8 3.0       All pertinent labs within the past 24 hours have been reviewed.    Significant Imaging:  I have reviewed and interpreted all pertinent imaging results/findings within the past 24 hours.    Assessment/Plan:     Pleural effusion associated with hepatic disorder  54 y.o male with h/o decompensated liver cirrhosis, hepatorenal syndome presents with R sided pleural effusion. 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     - In order to perform Thoracentesis Platelet of >50 and INR <1.5 are recommended   - will plan for thoracentesis for symptomatic relief If labs improve       Artem Raya MD  Pulmonology  [facility]

## 2019-10-25 NOTE — CONSULTS
Palliative Care Consult.    Consult received. Will review chart and discuss with team prior to seeing patient. Thank you for the consult..    Karena FONTAINE, ACNS-BC, ACHPN

## 2019-10-25 NOTE — DISCHARGE SUMMARY
Discharge Summary  Hospital Medicine    Patient Name: Buck Gann  MRN:  [id number]  Hospital Medicine Team: [facility] MED L Steve Vang MD  Date of Admission:  10/23/2019     Date of Discharge:  10/25/2019  Length of Stay:  LOS: 2 days   Principal Problem:  Alcoholic cirrhosis of liver with ascites     Active Hospital Problems    Diagnosis  POA    *Alcoholic cirrhosis of liver with ascites [K70.31]  Yes    Pleural effusion associated with hepatic disorder [K76.9, J91.8]  Yes    ATN (acute tubular necrosis) [N17.0]  Yes    Hypotension [I95.9]  Yes    Coagulopathy [D68.9]  Yes    Anemia of chronic disease [D63.8]  Yes    Thrombocytopenia [D69.6]  Yes    Metabolic acidosis [E87.2]  Yes    Moderate protein-calorie malnutrition [E44.0]  Yes    DEON (acute kidney injury) [N17.9]  Yes    Acute hypoxemic respiratory failure [J96.01]  Yes    Alcohol use disorder, severe, dependence [F10.20]  Yes    Pre-transplant evaluation for liver transplant [Z01.818]  Not Applicable      Resolved Hospital Problems   No resolved problems to display.       History of Present Illness:      54M with alcoholic cirrhosis presents as transfer from [facility] for management of decompensated cirrhosis and oliguria.  He was admitted there 10/16 with 3 weeks of progressively worsening dyspnea, abdominal distension, and fatigue.  He was admitted to the ICU in respiratory distress and put on BiPAP.  He also had DENO with his creatinine up to 3 from 1.6 and apparently wasn't responding sufficiently to diuresis so within a day or so a dialysis line was placed and dialysis was begun for volume removal, after which his respiratory status began to improve.  He was able to wean down to 2-3 L NC O2.  His urine output has been scant, however.  A dinh was placed which has had a small amount of muddy colored output.  He had a 2L paracentesis during this hospitalization as well.  He has been having some persistent back and  abdominal pain while in the hospital for which he was receiving morphine.  He feels considerably better than he did a week ago.    He follows with Dr Smith as an outpatient and was referred for outpatient transplant workup a few months ago but due to financial concerns has so far not made it over here to begin the evaluation.  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  He admits to some occasional alcohol use over the past several months.      Hospital Course of Principle Problem Addressed:       # Decompensated alcohol cirrhosis with ascites  # Severe alcohol dependence  MELD-Na score: 37 at 10/25/2019  5:19 AM  MELD score: 37 at 10/25/2019  5:19 AM  Calculated from:  Serum Creatinine: 2.9 mg/dL at 10/25/2019  5:19 AM  Serum Sodium: 135 mmol/L at 10/25/2019  5:19 AM  Total Bilirubin: 22.1 mg/dL at 10/25/2019  5:19 AM  INR(ratio): 2.1 at 10/25/2019  5:19 AM  Age: 54 years  - patient actively drinking until about a month ago  - PETH pending  - addiction psych states high risk and needs 8 weeks IOP atleast  - U/S liver with doppler reviewed, shows large ascites; planning for IR paracentesis today  - hepatology consulted and patient is not a liver transplant candidate     # DEON  # ATN  # Metabolic acidosis   - patient oliguric and now on HD;  - nephrology consulted, s/p HD yesterday     # Hypotension  - started on midodrine 15 mg PO TID     # Acute hypoxemic respiratory failure   # Right sided pleural effusion  - currently on 2.5L NC  - pulmonology consulted, however due to platelet being low, holding off on thoracentesis  - IR thoracentesis today      # Anemia of chronic disease  # Thrombocytopenia  - monitor     # Coagulopathy due to liver disease  - vitamin K for 3 days  - DIC labs      # Moderate protein tati malnutrition  - PAB and novasource        Diet:  Renal   GI PPx:    DVT  PPx:    Goals of Care:  full     High Risk Conditions:  Respiratory failure     Disposition:  Transfer back to [facility] today; not a transplant candidate currently;      Steve Vang MD  Medical Director [facility] Medicine  Spectra:  [phone]  Pager: [phone]    Other Medical Problems Addressed in the Hospital:         Significant Diagnostic Tests/Imaging:     Recent Labs   Lab 10/23/19  0506 10/24/19  0502 10/25/19  0519   WBC 10.92 11.79 12.94*   HGB 9.0* 8.6* 9.1*   HCT 27.9* 26.3* 27.3*   PLT 33* 37* 40*     Recent Labs   Lab 10/23/19  0506 10/24/19  0502 10/25/19  0519    134* 135*   K 4.3 4.2 3.9    106 103   CO2 21* 21* 25   BUN 21* 27* 19   CREATININE 2.8* 3.4* 2.9*   CALCIUM 8.5* 8.6* 8.6*   MG 1.9 1.9 1.9   PHOS 3.1 3.8 3.0   PROT 5.5* 5.1* 5.3*   BILITOT 24.0* 22.0* 22.1*   ALKPHOS 132 126 144*   ALT 14 12 12   AST 53* 54* 61*         Special Treatments/Procedures:         Discharge Medications:      Current Discharge Medication List      START taking these medications    Details   lidocaine (LIDODERM) 5 % Place 1 patch onto the skin once daily. Remove & Discard patch within 12 hours or as directed by MD  Refills: 0      midodrine (PROAMATINE) 5 MG Tab Take 3 tablets (15 mg total) by mouth 3 (three) times daily.  Qty: 270 tablet, Refills: 11         CONTINUE these medications which have NOT CHANGED    Details   pantoprazole (PROTONIX) 40 MG tablet Take 40 mg by mouth once daily.          STOP taking these medications       furosemide (LASIX) 40 MG tablet Comments:   Reason for Stopping:         VIRT-CAPS 1 mg Cap Comments:   Reason for Stopping:               Discharge Diet:renal diet    Activity: activity as tolerated    Discharge Condition: Fair    Disposition: transfer back to [facility] today    Time spent on the discharge of the patient including review of hospital course with the patient. reviewing discharge medications and arranging follow-up care 45 minutes.   Patient was seen and examined on the date of discharge and determined to be suitable for discharge.    Future Appointments   Date Time Provider Department Center   11/12/2019  1:45 PM CARDIOVASCULAR, [facility] TARIK Vang MD  Medical Director [facility] Medicine  Spectra:  [phone]  Pager: [phone]

## 2019-10-25 NOTE — PT/OT/SLP PROGRESS
Physical Therapy Treatment    Patient Name:  Buck Gann   MRN:  [id number]    Recommendations:     Discharge Recommendations:  rehabilitation facility   Discharge Equipment Recommendations: (TBD)   Barriers to discharge: Inaccessible home and Decreased caregiver support    Assessment:     Buck Gann is a 54 y.o. male admitted with a medical diagnosis of Alcoholic cirrhosis of liver with ascites.  He presents with the following impairments/functional limitations:  weakness, impaired endurance, impaired self care skills, impaired functional mobilty, gait instability, impaired balance, decreased safety awareness, impaired cognition, impaired coordination. Pt progressing well with therapy, remains confused but able to follow all commands with increased time. Pt performed bed mobility with ModA and transfers with Fabian and RW. Pt took 4 steps to chair with RW and Fabian, pt requiring assistance to maintain upright balance and for RW management. Pt would continue to benefit from skilled PT services to improve functional mobility, balance, and ambulation to return to PLOF and reduce fall risk.       Rehab Prognosis: Good; patient would benefit from acute skilled PT services to address these deficits and reach maximum level of function.    Recent Surgery: * No surgery found *      Plan:     During this hospitalization, patient to be seen 4 x/week to address the identified rehab impairments via gait training, therapeutic exercises, therapeutic activities, neuromuscular re-education and progress toward the following goals:    · Plan of Care Expires:  11/23/19    Subjective     Chief Complaint: none  Patient/Family Comments/goals: return to PLOF   Pain/Comfort:  · Pain Rating 1: 0/10  · Pain Rating Post-Intervention 1: 0/10      Objective:     Communicated with RN prior to session.  Patient found supine with oxygen, telemetry upon PT entry to room.     General Precautions: Standard, fall   Orthopedic Precautions:N/A   Braces:  N/A     Functional Mobility:  · Bed Mobility:     · Scooting: moderate assistance  · Supine to Sit: moderate assistance  · Transfers:     · Sit to Stand:  minimum assistance with rolling walker  · Bed to Chair: minimum assistance with  rolling walker  using  Step Transfer  · Gait:   · 4 steps to chair with RW and Fabian, pt requiring assistance to maintain upright balance and for RW management    · Balance:   · Sitting: SBA   · Standing: CGA-Fabian      AM-PAC 6 CLICK MOBILITY  Turning over in bed (including adjusting bedclothes, sheets and blankets)?: 2  Sitting down on and standing up from a chair with arms (e.g., wheelchair, bedside commode, etc.): 3  Moving from lying on back to sitting on the side of the bed?: 2  Moving to and from a bed to a chair (including a wheelchair)?: 3  Need to walk in hospital room?: 2  Climbing 3-5 steps with a railing?: 1  Basic Mobility Total Score: 13       Therapeutic Activities and Exercises:    -Pt educated on:              -PT roles, expectations, and POC               -Safety with mobility              -Benefits of OOB activities to increase strength and functional mobility               -Performing ther ex for increasing LE ROM and strength              -Discharge recommendations       Patient left up in chair with all lines intact and call button in reach..    GOALS:   Multidisciplinary Problems     Physical Therapy Goals        Problem: Physical Therapy Goal    Goal Priority Disciplines Outcome Goal Variances Interventions   Physical Therapy Goal     PT, PT/OT Ongoing, Progressing     Description:  Goals to be met by: 2019     Patient will increase functional independence with mobility by performin. Supine to sit with MInimal Assistance  2. Sit to supine with MInimal Assistance  3. Sit to stand transfer with Stand-by Assistance  4. Gait  x 50 feet with Stand-by Assistance using LRAD.   5. Ascend/descend 4 stair with right Handrails Minimal Assistance.                        Time Tracking:     PT Received On: 10/25/19  PT Start Time: 0818     PT Stop Time: 0836  PT Total Time (min): 18 min     Billable Minutes: Therapeutic Activity 18    Treatment Type: Treatment  PT/PTA: PT     PTA Visit Number: 0     Parris Flores, PT  10/25/2019

## 2019-10-25 NOTE — CARE UPDATE
Nephrology Care Update    Patient being transferred back to [facility].   Will sign off.  Nephrology should follow patient at other hospital for continued RRt and check for kidney recovery.    Khanh Ulrich MD  Nephrology  [facility]

## 2019-10-25 NOTE — NURSING
To IR via stretcher with Oxygen at 3L n/c AAOX4, period of forgetfulness. Denies complaint of discomfort/pain. NAD

## 2019-10-25 NOTE — PROGRESS NOTES
Progress Note   Hospital Medicine         Patient Name: Buck Gann  MRN:  [id number]  [facility] Medicine Team: [facility] Steve Vang MD  Date of Admission:  10/23/2019     Length of Stay:  LOS: 2 days   Expected Discharge Date: 10/25/2019  Principal Problem:  Alcoholic cirrhosis of liver with ascites       Subjective:     Interval History/Overnight Events:  Patient doing ok today; planning on IR paracentesis and thoracentesis today and then transfer back to OSH today; patient still would like to remain full code and wants everything to be done;     Review of Systems   Constitutional: Negative for chills, fatigue, fever.   HENT: Negative for sore throat, trouble swallowing.    Eyes: Negative for photophobia, visual disturbance.   Respiratory: Negative for cough, shortness of breath.    Cardiovascular: Negative for chest pain, palpitations, leg swelling.   Gastrointestinal: Negative for abdominal pain, constipation, diarrhea, nausea, vomiting.   Endocrine: Negative for cold intolerance, heat intolerance.   Genitourinary: Negative for dysuria, frequency.   Musculoskeletal: Negative for arthralgias, myalgias.   Skin: Negative for rash, wound, erythema   Neurological: Negative for dizziness, syncope, weakness, light-headedness.   Psychiatric/Behavioral: Negative for confusion, hallucinations, anxiety  All other systems reviewed and are negative.    Objective:     Temp:  [97.4 °F (36.3 °C)-97.7 °F (36.5 °C)]   Pulse:  [78-94]   Resp:  [16-20]   BP: ()/(28-61)   SpO2:  [92 %-95 %]       Physical Exam:  Constitutional: appears weak and ill  Head: Normocephalic and atraumatic.   Mouth/Throat: Oropharynx is clear and moist.   Eyes: EOM are normal. Pupils are equal, round, and reactive to light. No scleral icterus.   Neck: Normal range of motion. Neck supple.   Cardiovascular: Normal rate and regular rhythm.  No murmur heard.  Pulmonary/Chest: Effort normal; decreased BS on right side;   Abdominal: Soft. Bowel  sounds are normal.  No distension or tenderness  Musculoskeletal: Normal range of motion. No edema.   Neurological: Alert and oriented to person, place, and time.   Skin: Skin is warm and dry.   Psychiatric: Normal mood and affect. Behavior is normal.     Recent Labs   Lab 10/23/19  0506 10/24/19  0502 10/25/19  0519   WBC 10.92 11.79 12.94*   HGB 9.0* 8.6* 9.1*   HCT 27.9* 26.3* 27.3*   PLT 33* 37* 40*     Recent Labs   Lab 10/23/19  0506 10/24/19  0502 10/25/19  0519    134* 135*   K 4.3 4.2 3.9    106 103   CO2 21* 21* 25   BUN 21* 27* 19   CREATININE 2.8* 3.4* 2.9*   GLU 84 90 81   CALCIUM 8.5* 8.6* 8.6*   MG 1.9 1.9 1.9   PHOS 3.1 3.8 3.0     Recent Labs   Lab 10/23/19  0506 10/24/19  0502 10/25/19  0519   ALKPHOS 132 126 144*   ALT 14 12 12   AST 53* 54* 61*   ALBUMIN 2.7* 2.4* 2.4*   PROT 5.5* 5.1* 5.3*   BILITOT 24.0* 22.0* 22.1*   INR 2.2* 2.2* 2.1*     No results for input(s): POCTGLUCOSE in the last 168 hours.     lidocaine  1 patch Transdermal Q24H    midodrine  15 mg Oral TID    pantoprazole  20 mg Oral Daily       Assessment and Plan     Mr. Buck Gann is a 54 y.o. male who presented to [facility] on 10/23/2019 with     Hospital Course:    Mr. Buck Gann was admitted to Hospital Medicine for management of     Active Hospital Problems    Diagnosis  POA    *Alcoholic cirrhosis of liver with ascites [K70.31]  Yes    Pleural effusion associated with hepatic disorder [K76.9, J91.8]  Yes    ATN (acute tubular necrosis) [N17.0]  Yes    Hypotension [I95.9]  Yes    Coagulopathy [D68.9]  Yes    Anemia of chronic disease [D63.8]  Yes    Thrombocytopenia [D69.6]  Yes    Metabolic acidosis [E87.2]  Yes    Moderate protein-calorie malnutrition [E44.0]  Yes    DEON (acute kidney injury) [N17.9]  Yes    Acute hypoxemic respiratory failure [J96.01]  Yes    Alcohol use disorder, severe, dependence [F10.20]  Yes    Pre-transplant evaluation for liver transplant [Z01.818]  Not Applicable       Resolved Hospital Problems   No resolved problems to display.     # Decompensated alcohol cirrhosis with ascites  # Severe alcohol dependence  MELD-Na score: 37 at 10/25/2019  5:19 AM  MELD score: 37 at 10/25/2019  5:19 AM  Calculated from:  Serum Creatinine: 2.9 mg/dL at 10/25/2019  5:19 AM  Serum Sodium: 135 mmol/L at 10/25/2019  5:19 AM  Total Bilirubin: 22.1 mg/dL at 10/25/2019  5:19 AM  INR(ratio): 2.1 at 10/25/2019  5:19 AM  Age: 54 years  - patient actively drinking until about a month ago  - PETH pending  - addiction psych states high risk and needs 8 weeks IOP atleast  - U/S liver with doppler reviewed, shows large ascites; planning for IR paracentesis today  - hepatology consulted and patient is not a liver transplant candidate    # DEON  # ATN  # Metabolic acidosis   - patient oliguric and now on HD;  - nephrology consulted, s/p HD yesterday    # Hypotension  - started on midodrine 15 mg PO TID    # Acute hypoxemic respiratory failure   # Right sided pleural effusion  - currently on 2.5L NC  - pulmonology consulted, however due to platelet being low, holding off on thoracentesis  - IR thoracentesis today     # Anemia of chronic disease  # Thrombocytopenia  - monitor    # Coagulopathy due to liver disease  - vitamin K for 3 days  - DIC labs     # Moderate protein tati malnutrition  - PAB and novasource      Diet:  Renal   GI PPx:    DVT PPx:    Goals of Care:  full    High Risk Conditions:  Respiratory failure    Disposition:  Transfer back to [facility] today; not a transplant candidate currently;     Steve Vang MD  Medical Director [facility] Medicine  Spectra:  [phone]  Pager: [phone]

## 2019-10-25 NOTE — SUBJECTIVE & OBJECTIVE
Interval History:  inpt pulmonology consult for pleural effusion involving the entire R lung. Pt currently 95% on 2L NC. SOB unchanged from yesterday. In no acute respiratory distress  Platelets and INR unchanged from previous day and therefore thoracentesis will be deferred until improvement    Objective:     Vital Signs (Most Recent):  Temp: 97.6 °F (36.4 °C) (10/25/19 0737)  Pulse: 80 (10/25/19 0737)  Resp: 20 (10/25/19 0737)  BP: (!) 103/51 (10/25/19 0737)  SpO2: 95 % (10/25/19 0737) Vital Signs (24h Range):  Temp:  [97.4 °F (36.3 °C)-97.7 °F (36.5 °C)] 97.6 °F (36.4 °C)  Pulse:  [70-94] 80  Resp:  [16-20] 20  SpO2:  [92 %-95 %] 95 %  BP: ()/(28-61) 103/51     Weight: 110.6 kg (243 lb 13.3 oz)  Body mass index is 36.01 kg/m².      Intake/Output Summary (Last 24 hours) at 10/25/2019 0915  Last data filed at 10/24/2019 1800  Gross per 24 hour   Intake 690 ml   Output 145 ml   Net 545 ml       Physical Exam   Constitutional: He is oriented to person, place, and time. He appears well-developed and well-nourished.   Yellow discoloration of skin    HENT:   Head: Normocephalic and atraumatic.   Eyes: Pupils are equal, round, and reactive to light. EOM are normal. Scleral icterus is present.   Neck: Normal range of motion. Neck supple. No JVD present. No tracheal deviation present.   Cardiovascular: Normal rate and regular rhythm. Exam reveals no gallop and no friction rub.   Murmur heard.  2/6 holosystolic murmur more pronounced over the RUSB   Pulmonary/Chest: Effort normal and breath sounds normal. No accessory muscle usage. No respiratory distress. He has no wheezes. He has no rales. He exhibits no tenderness.   Decrease breath sound over upper, mid, and lower R lung fields   Abdominal: Bowel sounds are normal. He exhibits distension. There is no tenderness. There is no rebound and no guarding.   Musculoskeletal: Normal range of motion.   Neurological: He is alert and oriented to person, place, and time.        Skin: Skin is warm and dry.   Multiple scattered areas of purpura and petechiae over upper chest, abdomen and upper extremities    Nursing note and vitals reviewed.      Vents:       Lines/Drains/Airways     Peripheral Intravenous Line                 Peripheral IV - Single Lumen 10/23/19 1530 20 G;1 3/4 in Left Forearm 1 day                Significant Labs:    CBC/Anemia Profile:  Recent Labs   Lab 10/24/19  0502 10/25/19  0519   WBC 11.79 12.94*   HGB 8.6* 9.1*   HCT 26.3* 27.3*   PLT 37* 40*   * 102*   RDW 24.0* 23.2*        Chemistries:  Recent Labs   Lab 10/24/19  0502 10/25/19  0519   * 135*   K 4.2 3.9    103   CO2 21* 25   BUN 27* 19   CREATININE 3.4* 2.9*   CALCIUM 8.6* 8.6*   ALBUMIN 2.4* 2.4*   PROT 5.1* 5.3*   BILITOT 22.0* 22.1*   ALKPHOS 126 144*   ALT 12 12   AST 54* 61*   MG 1.9 1.9   PHOS 3.8 3.0       All pertinent labs within the past 24 hours have been reviewed.    Significant Imaging:  I have reviewed and interpreted all pertinent imaging results/findings within the past 24 hours.

## 2019-10-26 NOTE — NURSING
Pt just left via stretcher with EMS. All personal belongings with pt. IV left in per receiving RN request. No distress noted.

## 2019-10-30 LAB — PHOSPHATIDYLETHANOL (PETH): 25 NG/ML

## 2020-02-03 NOTE — PLAN OF CARE
Call placed to [facility] ([phone]) to initiate possible return transfer to [facility] for Mr. Gann. Will continue to follow.    Vanesa Lynne RN  Ext [phone]   Statement Selected

## 2022-04-11 ENCOUNTER — HISTORICAL (OUTPATIENT)
Dept: ADMINISTRATIVE | Facility: HOSPITAL | Age: 57
End: 2022-04-11
Payer: COMMERCIAL

## 2022-04-26 VITALS
SYSTOLIC BLOOD PRESSURE: 120 MMHG | DIASTOLIC BLOOD PRESSURE: 77 MMHG | WEIGHT: 248.69 LBS | OXYGEN SATURATION: 97 % | HEIGHT: 69 IN | BODY MASS INDEX: 36.83 KG/M2

## 2022-04-30 NOTE — OP NOTE
Patient:   Buck Gann            MRN: [id number]            FIN: [id number]               Age:   52 years     Sex:  Male     :  1965   Associated Diagnoses:   None   Author:   Elgin Price MD        DATE OF SURGERY:  17    SURGEON:  Elgin Price MD    PREOPERATIVE DIAGNOSIS:  Right Inguinal Hernia    POST OPERATIVE DIAGNOSIS:  Same.    PROCEDURE:  Open Right Inguinal Hernia    ANESTHESIA:  General endotracheal anesthesia.    ESTIMATED BLOOD LOSS:  Approximately 20 cc.   Blood administered, none.    LAP AND INSTRUMENT COUNT:  Correct X2 at the end of the case.    SPECIMENS:  Hernia Sac and cord Lipoma    INDICATION AND SIGNIFICANT HISTORY:  Patient is a 52-year-old male complaining of buldge in his right groin for the past few weeks.  Patient was worked up clincally and found to have a right inguinal hernia.  Risks and benefits of surgery was discussed with the patient who voiced understanding of risks / benefits and elected to proceed with surgery.        PROCEDURE IN DETAIL:  Once informed consents were obtained, the patient was taken to the OR and placed supine on the OR table.  After general endotracheal anesthesia was induced the abdomen was prepped and draped in the standard surgical fashion.  An incision was made in line with the inguinal ligament in the right lower quadrant.  Dissection was carried out through Patricia's fascia to the level of the external oblique aponeurosis.  This was then opened in the line of its fibers sharply and retracted laterally.  The cord and cord structures were dissected off the pelvic floor and wrapped with a Penrose drain for retraction.  The ilioinguinal nerve was dissected off the cord and retracted out of the operative area.  Attention was then redirected to the anterior medial portion of the sac which was dissected and the hernia sac was found.  The patient had a large inguinal hernia sac with chronic scarring to all surrounding tissue.   Care was taken to slowly and easily dissect all the surrounding structures including the vas deferens off the hernia sac from the cord.  The hernia sac was then opened and no abdominal contents were visualized.  The hernia defect was then closed and high ligated with a 2-0 silk suture followed by a silki simple tie.  The wound was then copiously irrigated and dried.  Due to a moderate degree of laxity in the pelvic floor, a piece of mesh was placed, secured to the pubic tubercle and inguinal ligament laterally, and shelving border medially.  External oblique aponeurosis was closed with a 2-0 Vicryl suture in a running fashion.  Patricia's fascia was closed with 3-0 Vicryl suture in a running subcuticular fashion.  The skin was then closed with a 4-0 Vicryl suture in a subcuticular fashion.  The skin was clean and dry and a dry sterile dressing was placed on the wound.  Lap and instrument counts were correct times two at the end of the case.  The patient tolerated the procedure well and was transported to the Recovery Room in good condition.

## 2024-06-24 NOTE — TELEPHONE ENCOUNTER
Attempted to reach patient to discuss, left message for return call to clinic.    Attempt #1    Funmilayo Maldonado LPN     Pt called [facility] re need for insurance information. Per [employer]. Pt did have insurance pt it termed out. Pt currently with no insurance.